# Patient Record
Sex: MALE | Race: WHITE | NOT HISPANIC OR LATINO | Employment: OTHER | URBAN - NONMETROPOLITAN AREA
[De-identification: names, ages, dates, MRNs, and addresses within clinical notes are randomized per-mention and may not be internally consistent; named-entity substitution may affect disease eponyms.]

---

## 2021-05-30 ENCOUNTER — APPOINTMENT (EMERGENCY)
Dept: ULTRASOUND IMAGING | Facility: HOSPITAL | Age: 61
End: 2021-05-30
Payer: COMMERCIAL

## 2021-05-30 ENCOUNTER — ANESTHESIA (OUTPATIENT)
Dept: PERIOP | Facility: HOSPITAL | Age: 61
End: 2021-05-30
Payer: COMMERCIAL

## 2021-05-30 ENCOUNTER — APPOINTMENT (EMERGENCY)
Dept: CT IMAGING | Facility: HOSPITAL | Age: 61
End: 2021-05-30
Payer: COMMERCIAL

## 2021-05-30 ENCOUNTER — ANESTHESIA EVENT (OUTPATIENT)
Dept: PERIOP | Facility: HOSPITAL | Age: 61
End: 2021-05-30
Payer: COMMERCIAL

## 2021-05-30 ENCOUNTER — HOSPITAL ENCOUNTER (OUTPATIENT)
Facility: HOSPITAL | Age: 61
Setting detail: OUTPATIENT SURGERY
Discharge: HOME | End: 2021-05-31
Attending: STUDENT IN AN ORGANIZED HEALTH CARE EDUCATION/TRAINING PROGRAM | Admitting: STUDENT IN AN ORGANIZED HEALTH CARE EDUCATION/TRAINING PROGRAM
Payer: COMMERCIAL

## 2021-05-30 DIAGNOSIS — K82.8 GALLBLADDER SLUDGE: Primary | ICD-10-CM

## 2021-05-30 DIAGNOSIS — R10.11 RUQ ABDOMINAL PAIN: ICD-10-CM

## 2021-05-30 DIAGNOSIS — K81.0 ACUTE CHOLECYSTITIS: ICD-10-CM

## 2021-05-30 PROBLEM — F41.9 ANXIETY: Status: ACTIVE | Noted: 2021-05-30

## 2021-05-30 PROBLEM — K21.9 GASTROESOPHAGEAL REFLUX DISEASE: Status: ACTIVE | Noted: 2021-05-30

## 2021-05-30 PROBLEM — E78.5 HYPERLIPIDEMIA: Status: ACTIVE | Noted: 2021-05-30

## 2021-05-30 PROBLEM — I10 HTN (HYPERTENSION): Status: ACTIVE | Noted: 2021-05-30

## 2021-05-30 PROBLEM — E11.9 DIABETES MELLITUS, TYPE 2 (HCC): Status: ACTIVE | Noted: 2021-05-30

## 2021-05-30 LAB
ALBUMIN SERPL BCP-MCNC: 4.1 G/DL (ref 3.5–5)
ALP SERPL-CCNC: 117 U/L (ref 46–116)
ALT SERPL W P-5'-P-CCNC: 43 U/L (ref 12–78)
ANION GAP SERPL CALCULATED.3IONS-SCNC: 6 MMOL/L (ref 4–13)
AST SERPL W P-5'-P-CCNC: 20 U/L (ref 5–45)
BASOPHILS # BLD AUTO: 0.08 THOUSANDS/ΜL (ref 0–0.1)
BASOPHILS NFR BLD AUTO: 1 % (ref 0–1)
BILIRUB DIRECT SERPL-MCNC: 0.1 MG/DL (ref 0–0.2)
BILIRUB SERPL-MCNC: 0.34 MG/DL (ref 0.2–1)
BUN SERPL-MCNC: 17 MG/DL (ref 5–25)
CALCIUM SERPL-MCNC: 9 MG/DL (ref 8.3–10.1)
CHLORIDE SERPL-SCNC: 104 MMOL/L (ref 100–108)
CO2 SERPL-SCNC: 30 MMOL/L (ref 21–32)
CREAT SERPL-MCNC: 1.2 MG/DL (ref 0.6–1.3)
EOSINOPHIL # BLD AUTO: 0.41 THOUSAND/ΜL (ref 0–0.61)
EOSINOPHIL NFR BLD AUTO: 5 % (ref 0–6)
ERYTHROCYTE [DISTWIDTH] IN BLOOD BY AUTOMATED COUNT: 12.3 % (ref 11.6–15.1)
GFR SERPL CREATININE-BSD FRML MDRD: 66 ML/MIN/1.73SQ M
GLUCOSE SERPL-MCNC: 186 MG/DL (ref 65–140)
GLUCOSE SERPL-MCNC: 192 MG/DL (ref 65–140)
HCT VFR BLD AUTO: 44.3 % (ref 36.5–49.3)
HGB BLD-MCNC: 14.7 G/DL (ref 12–17)
IMM GRANULOCYTES # BLD AUTO: 0.02 THOUSAND/UL (ref 0–0.2)
IMM GRANULOCYTES NFR BLD AUTO: 0 % (ref 0–2)
LIPASE SERPL-CCNC: 315 U/L (ref 73–393)
LYMPHOCYTES # BLD AUTO: 3.86 THOUSANDS/ΜL (ref 0.6–4.47)
LYMPHOCYTES NFR BLD AUTO: 43 % (ref 14–44)
MCH RBC QN AUTO: 31.6 PG (ref 26.8–34.3)
MCHC RBC AUTO-ENTMCNC: 33.2 G/DL (ref 31.4–37.4)
MCV RBC AUTO: 95 FL (ref 82–98)
MONOCYTES # BLD AUTO: 0.78 THOUSAND/ΜL (ref 0.17–1.22)
MONOCYTES NFR BLD AUTO: 9 % (ref 4–12)
NEUTROPHILS # BLD AUTO: 3.8 THOUSANDS/ΜL (ref 1.85–7.62)
NEUTS SEG NFR BLD AUTO: 42 % (ref 43–75)
NRBC BLD AUTO-RTO: 0 /100 WBCS
PLATELET # BLD AUTO: 245 THOUSANDS/UL (ref 149–390)
PMV BLD AUTO: 9.4 FL (ref 8.9–12.7)
POTASSIUM SERPL-SCNC: 4.4 MMOL/L (ref 3.5–5.3)
PROT SERPL-MCNC: 6.9 G/DL (ref 6.4–8.2)
RBC # BLD AUTO: 4.65 MILLION/UL (ref 3.88–5.62)
SODIUM SERPL-SCNC: 140 MMOL/L (ref 136–145)
WBC # BLD AUTO: 8.95 THOUSAND/UL (ref 4.31–10.16)

## 2021-05-30 PROCEDURE — 96361 HYDRATE IV INFUSION ADD-ON: CPT

## 2021-05-30 PROCEDURE — 36415 COLL VENOUS BLD VENIPUNCTURE: CPT | Performed by: STUDENT IN AN ORGANIZED HEALTH CARE EDUCATION/TRAINING PROGRAM

## 2021-05-30 PROCEDURE — 96376 TX/PRO/DX INJ SAME DRUG ADON: CPT

## 2021-05-30 PROCEDURE — 80048 BASIC METABOLIC PNL TOTAL CA: CPT | Performed by: STUDENT IN AN ORGANIZED HEALTH CARE EDUCATION/TRAINING PROGRAM

## 2021-05-30 PROCEDURE — 83690 ASSAY OF LIPASE: CPT | Performed by: STUDENT IN AN ORGANIZED HEALTH CARE EDUCATION/TRAINING PROGRAM

## 2021-05-30 PROCEDURE — 96374 THER/PROPH/DIAG INJ IV PUSH: CPT

## 2021-05-30 PROCEDURE — G1004 CDSM NDSC: HCPCS

## 2021-05-30 PROCEDURE — 88304 TISSUE EXAM BY PATHOLOGIST: CPT | Performed by: PATHOLOGY

## 2021-05-30 PROCEDURE — 99285 EMERGENCY DEPT VISIT HI MDM: CPT

## 2021-05-30 PROCEDURE — 76705 ECHO EXAM OF ABDOMEN: CPT

## 2021-05-30 PROCEDURE — 85025 COMPLETE CBC W/AUTO DIFF WBC: CPT | Performed by: STUDENT IN AN ORGANIZED HEALTH CARE EDUCATION/TRAINING PROGRAM

## 2021-05-30 PROCEDURE — 99284 EMERGENCY DEPT VISIT MOD MDM: CPT | Performed by: STUDENT IN AN ORGANIZED HEALTH CARE EDUCATION/TRAINING PROGRAM

## 2021-05-30 PROCEDURE — 74176 CT ABD & PELVIS W/O CONTRAST: CPT

## 2021-05-30 PROCEDURE — 80076 HEPATIC FUNCTION PANEL: CPT | Performed by: STUDENT IN AN ORGANIZED HEALTH CARE EDUCATION/TRAINING PROGRAM

## 2021-05-30 PROCEDURE — 82948 REAGENT STRIP/BLOOD GLUCOSE: CPT

## 2021-05-30 RX ORDER — KETOROLAC TROMETHAMINE 30 MG/ML
15 INJECTION, SOLUTION INTRAMUSCULAR; INTRAVENOUS ONCE
Status: COMPLETED | OUTPATIENT
Start: 2021-05-30 | End: 2021-05-30

## 2021-05-30 RX ORDER — NEOSTIGMINE METHYLSULFATE 1 MG/ML
INJECTION INTRAVENOUS AS NEEDED
Status: DISCONTINUED | OUTPATIENT
Start: 2021-05-30 | End: 2021-05-30

## 2021-05-30 RX ORDER — HYDROMORPHONE HCL/PF 1 MG/ML
SYRINGE (ML) INJECTION AS NEEDED
Status: DISCONTINUED | OUTPATIENT
Start: 2021-05-30 | End: 2021-05-30

## 2021-05-30 RX ORDER — DEXAMETHASONE SODIUM PHOSPHATE 4 MG/ML
INJECTION, SOLUTION INTRA-ARTICULAR; INTRALESIONAL; INTRAMUSCULAR; INTRAVENOUS; SOFT TISSUE AS NEEDED
Status: DISCONTINUED | OUTPATIENT
Start: 2021-05-30 | End: 2021-05-30

## 2021-05-30 RX ORDER — PANTOPRAZOLE SODIUM 40 MG/1
40 TABLET, DELAYED RELEASE ORAL
Status: DISCONTINUED | OUTPATIENT
Start: 2021-05-31 | End: 2021-05-31 | Stop reason: HOSPADM

## 2021-05-30 RX ORDER — FLUVOXAMINE MALEATE 100 MG
100 TABLET ORAL
COMMUNITY

## 2021-05-30 RX ORDER — MIDAZOLAM HYDROCHLORIDE 2 MG/2ML
INJECTION, SOLUTION INTRAMUSCULAR; INTRAVENOUS AS NEEDED
Status: DISCONTINUED | OUTPATIENT
Start: 2021-05-30 | End: 2021-05-30

## 2021-05-30 RX ORDER — DULAGLUTIDE 0.75 MG/.5ML
INJECTION, SOLUTION SUBCUTANEOUS
COMMUNITY

## 2021-05-30 RX ORDER — ONDANSETRON 2 MG/ML
INJECTION INTRAMUSCULAR; INTRAVENOUS AS NEEDED
Status: DISCONTINUED | OUTPATIENT
Start: 2021-05-30 | End: 2021-05-30

## 2021-05-30 RX ORDER — ROCURONIUM BROMIDE 10 MG/ML
INJECTION, SOLUTION INTRAVENOUS AS NEEDED
Status: DISCONTINUED | OUTPATIENT
Start: 2021-05-30 | End: 2021-05-30

## 2021-05-30 RX ORDER — ALPRAZOLAM 1 MG/1
TABLET ORAL
COMMUNITY

## 2021-05-30 RX ORDER — GLYCOPYRROLATE 0.2 MG/ML
INJECTION INTRAMUSCULAR; INTRAVENOUS AS NEEDED
Status: DISCONTINUED | OUTPATIENT
Start: 2021-05-30 | End: 2021-05-30

## 2021-05-30 RX ORDER — LOSARTAN POTASSIUM 25 MG/1
25 TABLET ORAL DAILY
Status: DISCONTINUED | OUTPATIENT
Start: 2021-05-30 | End: 2021-05-31 | Stop reason: HOSPADM

## 2021-05-30 RX ORDER — CALCIUM CARBONATE 300MG(750)
TABLET,CHEWABLE ORAL
COMMUNITY

## 2021-05-30 RX ORDER — ALBUTEROL SULFATE 2.5 MG/3ML
2.5 SOLUTION RESPIRATORY (INHALATION) ONCE AS NEEDED
Status: DISCONTINUED | OUTPATIENT
Start: 2021-05-30 | End: 2021-05-30 | Stop reason: HOSPADM

## 2021-05-30 RX ORDER — PANTOPRAZOLE SODIUM 40 MG/1
TABLET, DELAYED RELEASE ORAL
COMMUNITY

## 2021-05-30 RX ORDER — LIDOCAINE HYDROCHLORIDE AND EPINEPHRINE 10; 10 MG/ML; UG/ML
INJECTION, SOLUTION INFILTRATION; PERINEURAL AS NEEDED
Status: DISCONTINUED | OUTPATIENT
Start: 2021-05-30 | End: 2021-05-30 | Stop reason: HOSPADM

## 2021-05-30 RX ORDER — LOSARTAN POTASSIUM 25 MG/1
25 TABLET ORAL DAILY
COMMUNITY

## 2021-05-30 RX ORDER — FENTANYL CITRATE/PF 50 MCG/ML
25 SYRINGE (ML) INJECTION
Status: DISCONTINUED | OUTPATIENT
Start: 2021-05-30 | End: 2021-05-30 | Stop reason: HOSPADM

## 2021-05-30 RX ORDER — FEXOFENADINE HCL 180 MG/1
TABLET ORAL
COMMUNITY

## 2021-05-30 RX ORDER — CEFAZOLIN SODIUM 2 G/50ML
2000 SOLUTION INTRAVENOUS
Status: COMPLETED | OUTPATIENT
Start: 2021-05-30 | End: 2021-05-30

## 2021-05-30 RX ORDER — IBUPROFEN 600 MG/1
600 TABLET ORAL EVERY 6 HOURS PRN
Status: DISCONTINUED | OUTPATIENT
Start: 2021-05-30 | End: 2021-05-31 | Stop reason: HOSPADM

## 2021-05-30 RX ORDER — ALPRAZOLAM 0.5 MG/1
1 TABLET ORAL
Status: DISCONTINUED | OUTPATIENT
Start: 2021-05-30 | End: 2021-05-31 | Stop reason: HOSPADM

## 2021-05-30 RX ORDER — SODIUM CHLORIDE, SODIUM LACTATE, POTASSIUM CHLORIDE, CALCIUM CHLORIDE 600; 310; 30; 20 MG/100ML; MG/100ML; MG/100ML; MG/100ML
125 INJECTION, SOLUTION INTRAVENOUS CONTINUOUS
Status: DISCONTINUED | OUTPATIENT
Start: 2021-05-30 | End: 2021-05-31

## 2021-05-30 RX ORDER — SODIUM CHLORIDE, SODIUM LACTATE, POTASSIUM CHLORIDE, CALCIUM CHLORIDE 600; 310; 30; 20 MG/100ML; MG/100ML; MG/100ML; MG/100ML
INJECTION, SOLUTION INTRAVENOUS CONTINUOUS PRN
Status: DISCONTINUED | OUTPATIENT
Start: 2021-05-30 | End: 2021-05-30

## 2021-05-30 RX ORDER — PROPOFOL 10 MG/ML
INJECTION, EMULSION INTRAVENOUS AS NEEDED
Status: DISCONTINUED | OUTPATIENT
Start: 2021-05-30 | End: 2021-05-30

## 2021-05-30 RX ORDER — ACETAMINOPHEN 325 MG/1
650 TABLET ORAL EVERY 6 HOURS PRN
Status: DISCONTINUED | OUTPATIENT
Start: 2021-05-30 | End: 2021-05-31 | Stop reason: HOSPADM

## 2021-05-30 RX ORDER — MAGNESIUM HYDROXIDE 1200 MG/15ML
LIQUID ORAL AS NEEDED
Status: DISCONTINUED | OUTPATIENT
Start: 2021-05-30 | End: 2021-05-30 | Stop reason: HOSPADM

## 2021-05-30 RX ORDER — MELATONIN
1000 DAILY
COMMUNITY

## 2021-05-30 RX ORDER — FENTANYL CITRATE 50 UG/ML
INJECTION, SOLUTION INTRAMUSCULAR; INTRAVENOUS AS NEEDED
Status: DISCONTINUED | OUTPATIENT
Start: 2021-05-30 | End: 2021-05-30

## 2021-05-30 RX ORDER — KETOROLAC TROMETHAMINE 30 MG/ML
INJECTION, SOLUTION INTRAMUSCULAR; INTRAVENOUS AS NEEDED
Status: DISCONTINUED | OUTPATIENT
Start: 2021-05-30 | End: 2021-05-30

## 2021-05-30 RX ORDER — ONDANSETRON 2 MG/ML
4 INJECTION INTRAMUSCULAR; INTRAVENOUS ONCE AS NEEDED
Status: DISCONTINUED | OUTPATIENT
Start: 2021-05-30 | End: 2021-05-30 | Stop reason: HOSPADM

## 2021-05-30 RX ORDER — ATORVASTATIN CALCIUM 20 MG/1
20 TABLET, FILM COATED ORAL DAILY
COMMUNITY

## 2021-05-30 RX ADMIN — MORPHINE SULFATE 2 MG: 2 INJECTION, SOLUTION INTRAMUSCULAR; INTRAVENOUS at 16:29

## 2021-05-30 RX ADMIN — PROPOFOL 50 MG: 10 INJECTION, EMULSION INTRAVENOUS at 13:19

## 2021-05-30 RX ADMIN — ACETAMINOPHEN 650 MG: 325 TABLET ORAL at 19:10

## 2021-05-30 RX ADMIN — CEFAZOLIN SODIUM 2000 MG: 2 SOLUTION INTRAVENOUS at 12:25

## 2021-05-30 RX ADMIN — SODIUM CHLORIDE, SODIUM LACTATE, POTASSIUM CHLORIDE, AND CALCIUM CHLORIDE 125 ML/HR: .6; .31; .03; .02 INJECTION, SOLUTION INTRAVENOUS at 17:13

## 2021-05-30 RX ADMIN — HYDROMORPHONE HYDROCHLORIDE 0.5 MG: 1 INJECTION, SOLUTION INTRAMUSCULAR; INTRAVENOUS; SUBCUTANEOUS at 13:19

## 2021-05-30 RX ADMIN — ONDANSETRON 4 MG: 2 INJECTION INTRAMUSCULAR; INTRAVENOUS at 13:20

## 2021-05-30 RX ADMIN — DEXAMETHASONE SODIUM PHOSPHATE 4 MG: 4 INJECTION, SOLUTION INTRAMUSCULAR; INTRAVENOUS at 12:45

## 2021-05-30 RX ADMIN — KETOROLAC TROMETHAMINE 15 MG: 30 INJECTION, SOLUTION INTRAMUSCULAR at 08:06

## 2021-05-30 RX ADMIN — PROPOFOL 200 MG: 10 INJECTION, EMULSION INTRAVENOUS at 12:31

## 2021-05-30 RX ADMIN — KETOROLAC TROMETHAMINE 15 MG: 30 INJECTION, SOLUTION INTRAMUSCULAR at 13:20

## 2021-05-30 RX ADMIN — KETOROLAC TROMETHAMINE 15 MG: 30 INJECTION, SOLUTION INTRAMUSCULAR at 05:54

## 2021-05-30 RX ADMIN — FENTANYL CITRATE 100 MCG: 50 INJECTION, SOLUTION INTRAMUSCULAR; INTRAVENOUS at 12:26

## 2021-05-30 RX ADMIN — SODIUM CHLORIDE, SODIUM LACTATE, POTASSIUM CHLORIDE, AND CALCIUM CHLORIDE: .6; .31; .03; .02 INJECTION, SOLUTION INTRAVENOUS at 12:25

## 2021-05-30 RX ADMIN — METFORMIN HYDROCHLORIDE 1000 MG: 500 TABLET ORAL at 16:53

## 2021-05-30 RX ADMIN — NEOSTIGMINE METHYLSULFATE 3 MG: 1 INJECTION, SOLUTION INTRAVENOUS at 13:34

## 2021-05-30 RX ADMIN — SODIUM CHLORIDE 1000 ML: 0.9 INJECTION, SOLUTION INTRAVENOUS at 05:54

## 2021-05-30 RX ADMIN — SODIUM CHLORIDE, SODIUM LACTATE, POTASSIUM CHLORIDE, AND CALCIUM CHLORIDE: .6; .31; .03; .02 INJECTION, SOLUTION INTRAVENOUS at 13:12

## 2021-05-30 RX ADMIN — ROCURONIUM BROMIDE 50 MG: 10 INJECTION, SOLUTION INTRAVENOUS at 12:32

## 2021-05-30 RX ADMIN — LOSARTAN POTASSIUM 25 MG: 25 TABLET, FILM COATED ORAL at 20:07

## 2021-05-30 RX ADMIN — GLYCOPYRROLATE 0.4 MG: 0.2 INJECTION, SOLUTION INTRAMUSCULAR; INTRAVENOUS at 13:34

## 2021-05-30 RX ADMIN — HYDROMORPHONE HYDROCHLORIDE 0.5 MG: 1 INJECTION, SOLUTION INTRAMUSCULAR; INTRAVENOUS; SUBCUTANEOUS at 12:57

## 2021-05-30 RX ADMIN — MIDAZOLAM HYDROCHLORIDE 2 MG: 1 INJECTION, SOLUTION INTRAMUSCULAR; INTRAVENOUS at 12:22

## 2021-05-30 RX ADMIN — IBUPROFEN 600 MG: 600 TABLET ORAL at 22:40

## 2021-05-30 NOTE — ANESTHESIA POSTPROCEDURE EVALUATION
Post-Op Assessment Note    CV Status:  Stable    Pain management: adequate     Mental Status:  Alert and awake   Hydration Status:  Euvolemic   PONV Controlled:  Controlled   Airway Patency:  Patent      Post Op Vitals Reviewed: Yes      Staff: CRNA         No complications documented      /76 (05/30/21 1350)    Temp 97 6 °F (36 4 °C) (05/30/21 1350)    Pulse 74 (05/30/21 1350)   Resp 16 (05/30/21 1350)    SpO2 97 % (05/30/21 1350)

## 2021-05-30 NOTE — H&P
H&P Exam - General Surgery   Drew Turner 63 y o  male MRN: [id number]  Unit/Bed#: ED 12 Encounter: [id number]    Assessment/Plan      Assessment:  74-year-old male who presents with right upper quadrant abdominal pain  CT scan is equivocal for cholelithiasis  An ultrasound was completed which does show signs consistent with acute calculous cholecystitis     Plan: Will proceed with laparoscopic possible open cholecystectomy  Procedure was discussed in detail with the patient who agrees to proceed  All questions were answered  He will be admitted for observation postoperatively  On-call antibiotics to the operating room      History of Present Illness            HPI:  Drew Turner is a 63 y o  male who presents with right upper quadrant abdominal pain  The patient states he was awoken this morning by severe cramping abdominal pain in the right upper quadrant  He has never had a pain like this before  He has had kidney stones however he felt this pain was different  He denies any nausea or vomiting  In the past, fatty foods have caused diarrhea         Review of Systems   Constitutional: Negative  HENT: Negative  Respiratory: Negative  Cardiovascular: Negative  Gastrointestinal: Positive for abdominal pain  Negative for nausea and vomiting  Genitourinary: Negative  Musculoskeletal: Negative  Skin: Negative  Neurological: Negative           Historical Information         Medical History   History reviewed  No pertinent past medical history  Surgical History   History reviewed  No pertinent surgical history       Social History         Social History           Substance and Sexual Activity   Alcohol Use Yes    Frequency: 2-3 times a week      Social History          Substance and Sexual Activity   Drug Use Not Currently            E-Cigarette/Vaping    E-Cigarette Use Never User              E-Cigarette/Vaping Substances    Nicotine No      THC No      CBD No      Flavoring No      Other No      Unknown No        Social History           Tobacco Use   Smoking Status Former Smoker    Types: Cigarettes   Smokeless Tobacco Never Used      Family History: non-contributory     Meds/Allergies   all current active meds have been reviewed       Allergies   Allergen Reactions    Iodinated Diagnostic Agents Tachycardia         Objective   First Vitals:   Blood Pressure: 138/84 (05/30/21 0541)  Pulse: 72 (05/30/21 0541)  Temperature: 98 3 °F (36 8 °C) (05/30/21 0539)  Temp Source: Temporal (05/30/21 0539)  Respirations: 15 (05/30/21 0541)  Height: 5' 9" (175 3 cm) (05/30/21 0541)  Weight - Scale: 80 8 kg (178 lb 2 1 oz) (05/30/21 0541)  SpO2: 100 % (05/30/21 0541)     Current Vitals:   Blood Pressure: 138/87 (05/30/21 0730)  Pulse: 73 (05/30/21 0730)  Temperature: 98 3 °F (36 8 °C) (05/30/21 0539)  Temp Source: Temporal (05/30/21 0539)  Respirations: 16 (05/30/21 0630)  Height: 5' 9" (175 3 cm) (05/30/21 0541)  Weight - Scale: 80 8 kg (178 lb 2 1 oz) (05/30/21 0541)  SpO2: 98 % (05/30/21 0730)        Intake/Output Summary (Last 24 hours) at 5/30/2021 0746  Last data filed at 5/30/2021 2595      Gross per 24 hour   Intake 1000 ml   Output --   Net 1000 ml             Invasive Devices            Peripheral Intravenous Line                     Peripheral IV 05/30/21 Right Antecubital less than 1 day                     Physical Exam  Constitutional:       Appearance: Normal appearance  HENT:      Head: Normocephalic and atraumatic  Mouth/Throat:      Mouth: Mucous membranes are moist    Cardiovascular:      Rate and Rhythm: Normal rate and regular rhythm  Pulmonary:      Effort: Pulmonary effort is normal       Breath sounds: Normal breath sounds  Abdominal:      General: There is no distension  Palpations: Abdomen is soft  There is no mass  Tenderness: There is abdominal tenderness (RUQ moderate)  There is no guarding  Skin:     General: Skin is warm and dry  Coloration: Skin is not jaundiced  Neurological:      General: No focal deficit present  Mental Status: He is alert           Lab Results:   CBC:         Lab Results   Component Value Date     WBC 8 95 05/30/2021     HGB 14 7 05/30/2021     HCT 44 3 05/30/2021     MCV 95 05/30/2021      05/30/2021     MCH 31 6 05/30/2021     MCHC 33 2 05/30/2021     RDW 12 3 05/30/2021     MPV 9 4 05/30/2021     NRBC 0 05/30/2021   , CMP:         Lab Results   Component Value Date     SODIUM 140 05/30/2021     K 4 4 05/30/2021      05/30/2021     CO2 30 05/30/2021     BUN 17 05/30/2021     CREATININE 1 20 05/30/2021     CALCIUM 9 0 05/30/2021     AST 20 05/30/2021     ALT 43 05/30/2021     ALKPHOS 117 (H) 05/30/2021     EGFR 66 05/30/2021   , Lipase:         Lab Results   Component Value Date     LIPASE 315 05/30/2021      Imaging: CT abd/pelvis:   IMPRESSION:  1   Distended gallbladder with either layering small calculi versus hyperdense sludge   No CT evidence for acute cholecystitis at this time     If there is continued concern for acute cholecystitis, consider follow-up nuclear medicine HIDA scan to   evaluate for cystic duct patency   If deferred, follow-up right upper quadrant ultrasound should be obtained   Follow-up surgical consultation recommended      2   Moderate constipation      3   Hiatal hernia with reflux into the distal esophagus   Recommend follow-up GI consultation and/or upper endoscopy      4   1 mm nonobstructing calyceal calculus posterior mid pole right kidney      Abdominal Ultrasound:  RIGHT UPPER QUADRANT ULTRASOUND     INDICATION:     Epigastric pain      COMPARISON:  None     TECHNIQUE:   Real-time ultrasound of the right upper quadrant was performed with a curvilinear transducer with both volumetric sweeps and still imaging techniques      FINDINGS:     PANCREAS:  Obscured by overlying bowel gas      AORTA AND IVC:  Visualized portions are normal for patient age      LIVER:  Size: Within normal range  The liver measures 16 cm in the midclavicular line  Contour:  Surface contour is smooth  Parenchyma: There is mild diffuse increased echogenicity with smooth echotexture, without significant beam attenuation or loss of periportal echogenicity  Most consistent with mild hepatic steatosis  No evidence of suspicious mass  Ill-defined 1 9 x 1 4 x 1 7 cm hypoechoic focus adjacent to the gallbladder fossa may represent an area of focal fatty sparing  Limited imaging of the main portal vein shows it to be patent and hepatopetal      BILIARY:  Sludge is seen in the gallbladder  Trace pericholecystic fluid  Positive sonographic Daley's sign elicited  No intrahepatic biliary dilatation  CBD measures 6 mm  No choledocholithiasis      KIDNEY:   Right kidney measures 11 0 cm     Within normal limits      ASCITES:   None      IMPRESSION:     Findings suggestive of acute cholecystitis      Hepatic steatosis with small hypoechoic focus adjacent to the gallbladder fossa, probably an area of focal fatty sparing      Pancreas obscured by overlying bowel gas         The study was marked in EPIC for immediate notification      EKG, Pathology, and Other Studies: I have personally reviewed pertinent reports

## 2021-05-30 NOTE — CONSULTS
Consultation - General Surgery   Lynsey Reid 63 y o  male MRN: [id number]  Unit/Bed#: ED 12 Encounter: [id number]    Assessment/Plan     Assessment:  68-year-old male who presents with right upper quadrant abdominal pain  CT scan is equivocal for cholelithiasis  Laboratory results show only mildly elevated alk-phos which is not specific for gallbladder pathology    Plan:  Recommend evaluation with ultrasound of the right upper quadrant  If no signs concerning for acute cholecystitis, the patient may be discharged home if his symptoms further improve  The patient was also planning on seeing me on an elective basis for evaluation of a right inguinal hernia  He may follow-up with me for both right upper quadrant abdominal pain and the inguinal hernia  History of Present Illness     HPI:  Lynsey Reid is a 63 y o  male who presents with right upper quadrant abdominal pain  The patient states he was awoken this morning by severe cramping abdominal pain in the right upper quadrant  He has never had a pain like this before  He has had kidney stones however he felt this pain was different  He denies any nausea or vomiting  In the past, fatty foods have caused diarrhea  Review of Systems   Constitutional: Negative  HENT: Negative  Respiratory: Negative  Cardiovascular: Negative  Gastrointestinal: Positive for abdominal pain  Negative for nausea and vomiting  Genitourinary: Negative  Musculoskeletal: Negative  Skin: Negative  Neurological: Negative  Historical Information   History reviewed  No pertinent past medical history  History reviewed  No pertinent surgical history    Social History   Social History     Substance and Sexual Activity   Alcohol Use Yes    Frequency: 2-3 times a week     Social History     Substance and Sexual Activity   Drug Use Not Currently     E-Cigarette/Vaping    E-Cigarette Use Never User      E-Cigarette/Vaping Substances    Nicotine No     THC No     CBD No     Flavoring No     Other No     Unknown No      Social History     Tobacco Use   Smoking Status Former Smoker    Types: Cigarettes   Smokeless Tobacco Never Used     Family History: non-contributory    Meds/Allergies   all current active meds have been reviewed  Allergies   Allergen Reactions    Iodinated Diagnostic Agents Tachycardia       Objective   First Vitals:   Blood Pressure: 138/84 (05/30/21 0541)  Pulse: 72 (05/30/21 0541)  Temperature: 98 3 °F (36 8 °C) (05/30/21 0539)  Temp Source: Temporal (05/30/21 0539)  Respirations: 15 (05/30/21 0541)  Height: 5' 9" (175 3 cm) (05/30/21 0541)  Weight - Scale: 80 8 kg (178 lb 2 1 oz) (05/30/21 0541)  SpO2: 100 % (05/30/21 0541)    Current Vitals:   Blood Pressure: 138/87 (05/30/21 0730)  Pulse: 73 (05/30/21 0730)  Temperature: 98 3 °F (36 8 °C) (05/30/21 0539)  Temp Source: Temporal (05/30/21 0539)  Respirations: 16 (05/30/21 0630)  Height: 5' 9" (175 3 cm) (05/30/21 0541)  Weight - Scale: 80 8 kg (178 lb 2 1 oz) (05/30/21 0541)  SpO2: 98 % (05/30/21 0730)      Intake/Output Summary (Last 24 hours) at 5/30/2021 0746  Last data filed at 5/30/2021 0706  Gross per 24 hour   Intake 1000 ml   Output --   Net 1000 ml       Invasive Devices     Peripheral Intravenous Line            Peripheral IV 05/30/21 Right Antecubital less than 1 day                Physical Exam  Constitutional:       Appearance: Normal appearance  HENT:      Head: Normocephalic and atraumatic  Mouth/Throat:      Mouth: Mucous membranes are moist    Cardiovascular:      Rate and Rhythm: Normal rate and regular rhythm  Pulmonary:      Effort: Pulmonary effort is normal       Breath sounds: Normal breath sounds  Abdominal:      General: There is no distension  Palpations: Abdomen is soft  There is no mass  Tenderness: There is abdominal tenderness (RUQ moderate)  There is no guarding  Skin:     General: Skin is warm and dry        Coloration: Skin is not jaundiced  Neurological:      General: No focal deficit present  Mental Status: He is alert  Lab Results:   CBC:   Lab Results   Component Value Date    WBC 8 95 05/30/2021    HGB 14 7 05/30/2021    HCT 44 3 05/30/2021    MCV 95 05/30/2021     05/30/2021    MCH 31 6 05/30/2021    MCHC 33 2 05/30/2021    RDW 12 3 05/30/2021    MPV 9 4 05/30/2021    NRBC 0 05/30/2021   , CMP:   Lab Results   Component Value Date    SODIUM 140 05/30/2021    K 4 4 05/30/2021     05/30/2021    CO2 30 05/30/2021    BUN 17 05/30/2021    CREATININE 1 20 05/30/2021    CALCIUM 9 0 05/30/2021    AST 20 05/30/2021    ALT 43 05/30/2021    ALKPHOS 117 (H) 05/30/2021    EGFR 66 05/30/2021   , Lipase:   Lab Results   Component Value Date    LIPASE 315 05/30/2021     Imaging: CT abd/pelvis:   IMPRESSION:  1  Distended gallbladder with either layering small calculi versus hyperdense sludge  No CT evidence for acute cholecystitis at this time  If there is continued concern for acute cholecystitis, consider follow-up nuclear medicine HIDA scan to   evaluate for cystic duct patency  If deferred, follow-up right upper quadrant ultrasound should be obtained  Follow-up surgical consultation recommended      2  Moderate constipation      3   Hiatal hernia with reflux into the distal esophagus  Recommend follow-up GI consultation and/or upper endoscopy      4   1 mm nonobstructing calyceal calculus posterior mid pole right kidney         The study was marked in EPIC for immediate notification  EKG, Pathology, and Other Studies: I have personally reviewed pertinent reports

## 2021-05-30 NOTE — ANESTHESIA PREPROCEDURE EVALUATION
Procedure:  CHOLECYSTECTOMY LAPAROSCOPIC (N/A Abdomen)    Relevant Problems   CARDIO  Patient was having epigastric pain, being worked up, states EKG was negative and is supposed to have stress test next week; currently denies chets pain or palpatons   (+) HTN (hypertension)   (+) Hyperlipidemia   (-) Angina of effort (HCC)   (-) Chest pain   (-) Dysrhythmias   (-) Murmur      ENDO   (+) Diabetes mellitus, type 2 (HCC)      GI/HEPATIC   (+) Gastroesophageal reflux disease      /RENAL (within normal limits)      HEMATOLOGY (within normal limits)      MUSCULOSKELETAL (within normal limits)      NEURO/PSYCH  obsessive compulsive disorder   (+) Anxiety      PULMONARY (within normal limits)   (-) Asthma   (-) Shortness of breath   (-) Smoking        Physical Exam    Airway      TM Distance: <3 FB  Neck ROM: full     Dental   No notable dental hx     Cardiovascular  Rhythm: regular, Rate: normal,     Pulmonary  Breath sounds clear to auscultation,     Other Findings        Anesthesia Plan  ASA Score- 2     Anesthesia Type- general with ASA Monitors  Additional Monitors:   Airway Plan: ETT  Plan Factors-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

## 2021-05-30 NOTE — DISCHARGE INSTRUCTIONS
Laparoscopic Cholecystectomy   WHAT YOU NEED TO KNOW:   Laparoscopic cholecystectomy is surgery to remove your gallbladder  DISCHARGE INSTRUCTIONS:   Call Dr Syeda Mullen office at [phone] with any questions or concerns    Medicines: You may need any of the following:  · Prescription pain medicine - Take as directed    · You may also take tylenol as needed    · Take your medicine as directed  Contact your healthcare provider if you think your medicine is not helping or if you have side effects  Tell her if you are allergic to any medicine  Keep a list of the medicines, vitamins, and herbs you take  Include the amounts, and when and why you take them  Bring the list or the pill bottles to follow-up visits  Carry your medicine list with you in case of an emergency  Follow up with your healthcare provider 2 weeks after surgery, or as directed:  Write down your questions so you remember to ask them during your visits  Wound care: You may shower and pat the incisions dry  Do not soak underwater for 2 weeks   What to eat after surgery: You may eat whatever you feel up to following surgery  You may notice diarrhea with fatty foods  Drink plenty of liquids  When to return to work and other activities:  No lifting, pushing, or pulling greater then 10lb for 2 weeks  Walk as much as possible  YOU may drive when you are comfortable enough to turn and press the brake without pain medication    Contact your healthcare provider if:   · You have a fever over 101°F (38°C) or chills  · You have pain or nausea that is not relieved by medicine  · You have redness and swelling around your incisions, or blood or pus is leaking from your incisions  · You are constipated or have diarrhea  · Your skin or eyes are yellow, or your bowel movements are pale  · You have questions or concerns about your surgery, condition, or care  Seek care immediately or call 911 if:   · You cannot stop vomiting  · Your bowel movements are black or bloody  · You have pain in your abdomen and it is swollen or hard  · Your arm or leg feels warm, tender, and painful  It may look swollen and red  · You feel lightheaded, short of breath, and have chest pain  · You cough up blood  © 2017 [address] [employer] Information is for End User's use only and may not be sold, redistributed or otherwise used for commercial purposes  All illustrations and images included in CareNotes® are the copyrighted property of [employer] , [employer]  or [employer] Calzada  The above information is an  only  It is not intended as medical advice for individual conditions or treatments  Talk to your doctor, nurse or pharmacist before following any medical regimen to see if it is safe and effective for you

## 2021-05-30 NOTE — PLAN OF CARE
Problem: PAIN - ADULT  Goal: Verbalizes/displays adequate comfort level or baseline comfort level  Description: Interventions:  - Encourage patient to monitor pain and request assistance  - Assess pain using appropriate pain scale  - Administer analgesics based on type and severity of pain and evaluate response  - Implement non-pharmacological measures as appropriate and evaluate response  - Consider cultural and social influences on pain and pain management  - Notify physician/advanced practitioner if interventions unsuccessful or patient reports new pain  Outcome: Progressing     Problem: INFECTION - ADULT  Goal: Absence or prevention of progression during hospitalization  Description: INTERVENTIONS:  - Assess and monitor for signs and symptoms of infection  - Monitor lab/diagnostic results  - Monitor all insertion sites, i e  indwelling lines, tubes, and drains  - Monitor endotracheal if appropriate and nasal secretions for changes in amount and color  - [place] appropriate cooling/warming therapies per order  - Administer medications as ordered  - Instruct and encourage patient and family to use good hand hygiene technique  - Identify and instruct in appropriate isolation precautions for identified infection/condition  Outcome: Progressing     Problem: SAFETY ADULT  Goal: Patient will remain free of falls  Description: INTERVENTIONS:  - Assess patient frequently for physical needs  -  Identify cognitive and physical deficits and behaviors that affect risk of falls    -  [place] fall precautions as indicated by assessment   - Educate patient/family on patient safety including physical limitations  - Instruct patient to call for assistance with activity based on assessment  - Modify environment to reduce risk of injury  - Consider OT/PT consult to assist with strengthening/mobility  Outcome: Progressing  Goal: Maintain or return to baseline ADL function  Description: INTERVENTIONS:  -  Assess patient's ability to carry out ADLs; assess patient's baseline for ADL function and identify physical deficits which impact ability to perform ADLs (bathing, care of mouth/teeth, toileting, grooming, dressing, etc )  - Assess/evaluate cause of self-care deficits   - Assess range of motion  - Assess patient's mobility; develop plan if impaired  - Assess patient's need for assistive devices and provide as appropriate  - Encourage maximum independence but intervene and supervise when necessary  - Involve family in performance of ADLs  - Assess for home care needs following discharge   - Consider OT consult to assist with ADL evaluation and planning for discharge  - Provide patient education as appropriate  Outcome: Progressing  Goal: Maintain or return mobility status to optimal level  Description: INTERVENTIONS:  - Assess patient's baseline mobility status (ambulation, transfers, stairs, etc )    - Identify cognitive and physical deficits and behaviors that affect mobility  - Identify mobility aids required to assist with transfers and/or ambulation (gait belt, sit-to-stand, lift, walker, cane, etc )  - [place] fall precautions as indicated by assessment  - Record patient progress and toleration of activity level on Mobility SBAR; progress patient to next Phase/Stage  - Instruct patient to call for assistance with activity based on assessment  - Consider rehabilitation consult to assist with strengthening/weightbearing, etc   Outcome: Progressing     Problem: DISCHARGE PLANNING  Goal: Discharge to home or other facility with appropriate resources  Description: INTERVENTIONS:  - Identify barriers to discharge w/patient and caregiver  - Arrange for needed discharge resources and transportation as appropriate  - Identify discharge learning needs (meds, wound care, etc )  - Arrange for interpretive services to assist at discharge as needed  - Refer to Case Management Department for coordinating discharge planning if the patient needs post-hospital services based on physician/advanced practitioner order or complex needs related to functional status, cognitive ability, or social support system  Outcome: Progressing     Problem: Knowledge Deficit  Goal: Patient/family/caregiver demonstrates understanding of disease process, treatment plan, medications, and discharge instructions  Description: Complete learning assessment and assess knowledge base    Interventions:  - Provide teaching at level of understanding  - Provide teaching via preferred learning methods  Outcome: Progressing     Problem: SKIN/TISSUE INTEGRITY - ADULT  Goal: Incision(s), wounds(s) or drain site(s) healing without S/S of infection  Description: INTERVENTIONS  - Assess and document risk factors for skin impairment   - Assess and document dressing, incision, wound bed, drain sites and surrounding tissue  - Consider nutrition services referral as needed  - Oral mucous membranes remain intact  - Provide patient/ family education  Outcome: Progressing

## 2021-05-30 NOTE — ED PROVIDER NOTES
History  Chief Complaint   Patient presents with    Abdominal Pain     RUQ abdominal pain that started at 0430 this morning  denies n/v   denies diahrrea and constipation  HPI     63year old M  Hx of T2DM  States that he was woken from sleep with RUQ abdominal pain  Does not have a hx of biliary disease  Describes his pain as crampy in nature and 8/10 in severity  Lying back exacerbates his pain  Hasn't taken anything for pain; denies nausea, vomiting  Prior to Admission Medications   Prescriptions Last Dose Informant Patient Reported? Taking? ALPRAZolam (XANAX) 1 mg tablet 5/29/2021 at Unknown time  Yes Yes   Sig: Take by mouth   ASPIRIN 81 PO 5/29/2021 at Unknown time  Yes Yes   Sig: Take by mouth   Dulaglutide (Trulicity) 9 55 XR/2 7WS SOPN 5/29/2021 at Unknown time  Yes Yes   Sig: Inject under the skin   Magnesium 400 MG TABS 5/29/2021 at Unknown time  Yes Yes   Sig: Take by mouth   atorvastatin (LIPITOR) 20 mg tablet 5/29/2021 at Unknown time  Yes Yes   Sig: Take 20 mg by mouth daily   cholecalciferol (VITAMIN D3) 1,000 units tablet 5/29/2021 at Unknown time  Yes Yes   Sig: Take 1,000 Units by mouth daily   co-enzyme Q-10 30 MG capsule 5/29/2021 at Unknown time  Yes Yes   Sig: Take 30 mg by mouth daily   fexofenadine (ALLEGRA) 180 MG tablet 5/29/2021 at Unknown time  Yes Yes   Sig: Take by mouth   fluvoxaMINE (LUVOX) 100 mg tablet 5/29/2021 at Unknown time  Yes Yes   Sig: Take 100 mg by mouth   losartan (COZAAR) 25 mg tablet 5/29/2021 at Unknown time  Yes Yes   Sig: Take 25 mg by mouth daily   metFORMIN (GLUCOPHAGE) 1000 MG tablet 5/29/2021 at Unknown time  Yes Yes   Sig: Take 1,000 mg by mouth   pantoprazole (PROTONIX) 40 mg tablet 5/29/2021 at Unknown time  Yes Yes   Sig: Take by mouth      Facility-Administered Medications: None       History reviewed  No pertinent past medical history  History reviewed  No pertinent surgical history  History reviewed  No pertinent family history    I have reviewed and agree with the history as documented  E-Cigarette/Vaping    E-Cigarette Use Never User      E-Cigarette/Vaping Substances    Nicotine No     THC No     CBD No     Flavoring No     Other No     Unknown No      Social History     Tobacco Use    Smoking status: Former Smoker     Types: Cigarettes    Smokeless tobacco: Never Used   Substance Use Topics    Alcohol use: Yes     Frequency: 2-3 times a week    Drug use: Not Currently       Review of Systems   Constitutional: Negative for chills and fever  HENT: Negative for congestion, sinus pressure and sinus pain  Eyes: Negative for pain and visual disturbance  Respiratory: Negative for chest tightness and shortness of breath  Cardiovascular: Negative for chest pain and palpitations  Gastrointestinal: Positive for abdominal pain  Negative for nausea and vomiting  Genitourinary: Negative for dysuria and flank pain  Musculoskeletal: Negative for back pain and myalgias  Skin: Negative for color change and rash  Neurological: Negative for dizziness, weakness and light-headedness  Hematological: Does not bruise/bleed easily  Psychiatric/Behavioral: Negative for confusion and decreased concentration  Physical Exam  Physical Exam  Vitals signs and nursing note reviewed  Exam conducted with a chaperone present  Constitutional:       General: He is in acute distress  HENT:      Head: Normocephalic and atraumatic  Mouth/Throat:      Mouth: Mucous membranes are moist       Pharynx: Oropharynx is clear  Eyes:      Extraocular Movements: Extraocular movements intact  Pupils: Pupils are equal, round, and reactive to light  Cardiovascular:      Rate and Rhythm: Normal rate and regular rhythm  Heart sounds: Normal heart sounds  Pulmonary:      Effort: Pulmonary effort is normal       Breath sounds: Normal breath sounds  Abdominal:      General: Abdomen is flat  Palpations: Abdomen is soft  Tenderness: There is abdominal tenderness in the right upper quadrant  There is guarding  There is no rebound  Positive signs include Daley's sign  Skin:     General: Skin is warm  Capillary Refill: Capillary refill takes less than 2 seconds  Neurological:      General: No focal deficit present  Mental Status: He is alert and oriented to person, place, and time  Cranial Nerves: No cranial nerve deficit  Motor: No weakness     Psychiatric:         Mood and Affect: Mood normal          Behavior: Behavior normal        Vital Signs  ED Triage Vitals   Temperature Pulse Respirations Blood Pressure SpO2   05/30/21 0539 05/30/21 0541 05/30/21 0541 05/30/21 0541 05/30/21 0541   98 3 °F (36 8 °C) 72 15 138/84 100 %      Temp Source Heart Rate Source Patient Position - Orthostatic VS BP Location FiO2 (%)   05/30/21 0539 -- 05/30/21 0541 05/30/21 0541 --   Temporal  Lying Right arm       Pain Score       05/30/21 0541       8           Vitals:    05/30/21 0541   BP: 138/84   Pulse: 72   Patient Position - Orthostatic VS: Lying     ED Medications  Medications   ketorolac (TORADOL) injection 15 mg (15 mg Intravenous Given 5/30/21 0554)   sodium chloride 0 9 % bolus 1,000 mL (0 mL Intravenous Stopped 5/30/21 0706)     Diagnostic Studies  Results Reviewed     Procedure Component Value Units Date/Time    Lipase [[id number]]  (Normal) Collected: 05/30/21 0608    Lab Status: Final result Specimen: Blood from Arm, Right Updated: 05/30/21 0627     Lipase 315 u/L     Basic metabolic panel [[id number]]  (Abnormal) Collected: 05/30/21 0608    Lab Status: Final result Specimen: Blood from Arm, Right Updated: 05/30/21 3708     Sodium 140 mmol/L      Potassium 4 4 mmol/L      Chloride 104 mmol/L      CO2 30 mmol/L      ANION GAP 6 mmol/L      BUN 17 mg/dL      Creatinine 1 20 mg/dL      Glucose 192 mg/dL      Calcium 9 0 mg/dL      eGFR 66 ml/min/1 73sq m     Narrative:      [place] guidelines for Chronic Kidney Disease (CKD):     Stage 1 with normal or high GFR (GFR > 90 mL/min/1 73 square meters)    Stage 2 Mild CKD (GFR = 60-89 mL/min/1 73 square meters)    Stage 3A Moderate CKD (GFR = 45-59 mL/min/1 73 square meters)    Stage 3B Moderate CKD (GFR = 30-44 mL/min/1 73 square meters)    Stage 4 Severe CKD (GFR = 15-29 mL/min/1 73 square meters)    Stage 5 End Stage CKD (GFR <15 mL/min/1 73 square meters)  Note: GFR calculation is accurate only with a steady state creatinine    Hepatic function panel [[id number]]  (Abnormal) Collected: 05/30/21 0608    Lab Status: Final result Specimen: Blood from Arm, Right Updated: 05/30/21 0627     Total Bilirubin 0 34 mg/dL      Bilirubin, Direct 0 10 mg/dL      Alkaline Phosphatase 117 U/L      AST 20 U/L      ALT 43 U/L      Total Protein 6 9 g/dL      Albumin 4 1 g/dL     CBC and differential [[id number]]  (Abnormal) Collected: 05/30/21 0608    Lab Status: Final result Specimen: Blood from Arm, Right Updated: 05/30/21 0613     WBC 8 95 Thousand/uL      RBC 4 65 Million/uL      Hemoglobin 14 7 g/dL      Hematocrit 44 3 %      MCV 95 fL      MCH 31 6 pg      MCHC 33 2 g/dL      RDW 12 3 %      MPV 9 4 fL      Platelets 460 Thousands/uL      nRBC 0 /100 WBCs      Neutrophils Relative 42 %      Immat GRANS % 0 %      Lymphocytes Relative 43 %      Monocytes Relative 9 %      Eosinophils Relative 5 %      Basophils Relative 1 %      Neutrophils Absolute 3 80 Thousands/µL      Immature Grans Absolute 0 02 Thousand/uL      Lymphocytes Absolute 3 86 Thousands/µL      Monocytes Absolute 0 78 Thousand/µL      Eosinophils Absolute 0 41 Thousand/µL      Basophils Absolute 0 08 Thousands/µL              CT abdomen pelvis wo contrast   Final Result by Santosh Hopson DO (05/30 0636)   1  Distended gallbladder with either layering small calculi versus hyperdense sludge  No CT evidence for acute cholecystitis at this time      If there is continued concern for acute cholecystitis, consider follow-up nuclear medicine HIDA scan to    evaluate for cystic duct patency  If deferred, follow-up right upper quadrant ultrasound should be obtained  Follow-up surgical consultation recommended  2   Moderate constipation  3   Hiatal hernia with reflux into the distal esophagus  Recommend follow-up GI consultation and/or upper endoscopy  4   1 mm nonobstructing calyceal calculus posterior mid pole right kidney  The study was marked in [facility] for immediate notification  Workstation performed: [id number]                Procedures  Procedures     ED Course       MDM  Number of Diagnoses or Management Options  Diagnosis management comments: 51-year-old male  Acute onset right upper quadrant abdominal pain  Positive Daley sign  Mildly elevated alk-phos  CT interpretation above  General surgery contacted advised they will evaluate the patient in the ED  The patient was signed out to Dr Signa Goldberg  Plan discussed  The patient was stable while under my care  Disposition  Final diagnoses:   Gallbladder sludge   RUQ abdominal pain     ED Disposition     None      Follow-up Information    None         Patient's Medications   Discharge Prescriptions    No medications on file     No discharge procedures on file      PDMP Review     None          ED Provider  Electronically Signed by           Andres Saldaña DO  05/30/21 5318

## 2021-05-30 NOTE — ED NOTES
Pt voided in bathroom  Pt NPO since yesterday at 1800  Pt given chrolohexidine bath per protocol and changed into new gown and socks         Ruy Guo RN  05/30/21 3361

## 2021-05-30 NOTE — ED NOTES
Pt sent to OR with antibiotic and consents  pts wife taking all belongings        Donney Goodell, NOE  05/30/21 7077

## 2021-05-31 VITALS
HEIGHT: 69 IN | TEMPERATURE: 97.4 F | HEART RATE: 71 BPM | SYSTOLIC BLOOD PRESSURE: 113 MMHG | RESPIRATION RATE: 20 BRPM | BODY MASS INDEX: 26.38 KG/M2 | OXYGEN SATURATION: 97 % | WEIGHT: 178.13 LBS | DIASTOLIC BLOOD PRESSURE: 68 MMHG

## 2021-05-31 RX ORDER — ACETAMINOPHEN 325 MG/1
650 TABLET ORAL EVERY 6 HOURS PRN
Qty: 30 TABLET | Refills: 0 | Status: SHIPPED | OUTPATIENT
Start: 2021-05-31 | End: 2021-06-07

## 2021-05-31 RX ORDER — IBUPROFEN 600 MG/1
600 TABLET ORAL EVERY 6 HOURS PRN
Qty: 30 TABLET | Refills: 0 | Status: SHIPPED | OUTPATIENT
Start: 2021-05-31

## 2021-05-31 RX ADMIN — LOSARTAN POTASSIUM 25 MG: 25 TABLET, FILM COATED ORAL at 09:19

## 2021-05-31 RX ADMIN — SODIUM CHLORIDE, SODIUM LACTATE, POTASSIUM CHLORIDE, AND CALCIUM CHLORIDE 125 ML/HR: .6; .31; .03; .02 INJECTION, SOLUTION INTRAVENOUS at 01:44

## 2021-05-31 RX ADMIN — PANTOPRAZOLE SODIUM 40 MG: 40 TABLET, DELAYED RELEASE ORAL at 06:25

## 2021-05-31 RX ADMIN — ALPRAZOLAM 1 MG: 0.5 TABLET ORAL at 00:03

## 2021-05-31 RX ADMIN — METFORMIN HYDROCHLORIDE 1000 MG: 500 TABLET ORAL at 09:19

## 2021-05-31 NOTE — NURSING NOTE
Pt verbalized understanding of discharge instructions  Tolerated breakfast without difficulty   Pt was taken to the front entrance of the hospital

## 2021-05-31 NOTE — UTILIZATION REVIEW
Initial Clinical Review    Admission: Date/Time/Statement: 5/30/2021 1127 Observation   Admission Orders (From admission, onward)     Ordered        05/30/21 1127  Place in Observation  Once                   Orders Placed This Encounter   Procedures    Place in Observation     Standing Status:   Standing     Number of Occurrences:   1     Order Specific Question:   Level of Care     Answer:   Med Surg [16]     ED Arrival Information     Expected Arrival Acuity Means of Arrival Escorted By Service Admission Type    - 5/30/2021 05:33 Urgent Walk-In Spouse Surgery-General Urgent    Arrival Complaint    flank pain         Chief Complaint   Patient presents with    Abdominal Pain     RUQ abdominal pain that started at 0430 this morning  denies n/v   denies diahrrea and constipation  Initial Presentation:  this is a 63year old male from home to ED admitted to observation due to RUQ abdominal pain/cholecystitis  Presented due to to RUQ abdominal pain which awoke him the morning of arrival   On exam abdominal tenderness RUQ, guarding   + sebastian sign  Wbc 8 95    US gallbladder showed acute cholecystitis  In the ED given Toradol, 1 liter IVF  Plan is surgery, antibiotics per operatively, post procedure monitor for pain control, IVF, diet advancement  5/30/2021 Procedure CHOLECYSTECTOMY LAPAROSCOPIC   Findings - acute cholecystitis         ED Triage Vitals   Temperature Pulse Respirations Blood Pressure SpO2   05/30/21 0539 05/30/21 0541 05/30/21 0541 05/30/21 0541 05/30/21 0541   98 3 °F (36 8 °C) 72 15 138/84 100 %      Temp Source Heart Rate Source Patient Position - Orthostatic VS BP Location FiO2 (%)   05/30/21 0539 -- 05/30/21 0541 05/30/21 0541 --   Temporal  Lying Right arm       Pain Score       05/30/21 0541       8          Wt Readings from Last 1 Encounters:   05/30/21 80 8 kg (178 lb 2 1 oz)     Additional Vital Signs:   05/31/21 02:57:45  97 3 °F (36 3 °C)Abnormal   77  15  127/75  92  96 % --  --  --  --   05/30/21 23:25:31  97 7 °F (36 5 °C)  66  15  159/91  114  97 %  --  --  --  --   05/30/21 19:07:34  97 5 °F (36 4 °C)  77  18  134/93  107  97 %  --  --  --  --   05/30/21 15:01:24  --  66  14  117/83  94  97 %  --  --  --  --   05/30/21 14:30:52  97 5 °F (36 4 °C)  62  14  112/85  94  94 %  --  None (Room air)  --  --   05/30/21 1420  97 8 °F (36 6 °C)  63  18  125/71  --  96 %  --  None (Room air)  WDL  --   05/30/21 1405  --  72  18  134/63  --  95 %  --  None (Room air)  WDL  --   05/30/21 1350  97 6 °F (36 4 °C)  74  16  144/76  --  97 %  6 L/min  Simple mask  WDL         Pertinent Labs/Diagnostic Test Results:   5/30/2021 ct abdomen - Distended gallbladder with either layering small calculi versus hyperdense sludge  No CT evidence for acute cholecystitis at this time       2  Moderate constipation  3   Hiatal hernia with reflux into the distal esophagus  Recommend follow-up GI consultation and/or upper endoscopy  4   1 mm nonobstructing calyceal calculus posterior mid pole right kidney  5/30/2021 US gallbladder - Findings suggestive of acute cholecystitis  Hepatic steatosis with small hypoechoic focus adjacent to the gallbladder fossa, probably an area of focal fatty sparing  Pancreas obscured by overlying bowel gas    Results from last 7 days   Lab Units 05/30/21  0608   WBC Thousand/uL 8 95   HEMOGLOBIN g/dL 14 7   HEMATOCRIT % 44 3   PLATELETS Thousands/uL 245   NEUTROS ABS Thousands/µL 3 80     Results from last 7 days   Lab Units 05/30/21  0608   SODIUM mmol/L 140   POTASSIUM mmol/L 4 4   CHLORIDE mmol/L 104   CO2 mmol/L 30   ANION GAP mmol/L 6   BUN mg/dL 17   CREATININE mg/dL 1 20   EGFR ml/min/1 73sq m 66   CALCIUM mg/dL 9 0     Results from last 7 days   Lab Units 05/30/21  0608   AST U/L 20   ALT U/L 43   ALK PHOS U/L 117*   TOTAL PROTEIN g/dL 6 9   ALBUMIN g/dL 4 1   TOTAL BILIRUBIN mg/dL 0 34   BILIRUBIN DIRECT mg/dL 0 10     Results from last 7 days   Lab Units 05/30/21  1408   POC GLUCOSE mg/dl 186*     Results from last 7 days   Lab Units 05/30/21  0608   GLUCOSE RANDOM mg/dL 192*     Results from last 7 days   Lab Units 05/30/21  0608   LIPASE u/L 315     ED Treatment:   Medication Administration from 05/30/2021 0533 to 05/30/2021 1212       Date/Time Order Dose Route Action Comments     05/30/2021 0554 ketorolac (TORADOL) injection 15 mg 15 mg Intravenous Given      05/30/2021 0554 sodium chloride 0 9 % bolus 1,000 mL 1,000 mL Intravenous New Bag      05/30/2021 0806 ketorolac (TORADOL) injection 15 mg 15 mg Intravenous Given         Past Medical History:   Diagnosis Date    Kidney stone      Present on Admission:  **None**      Admitting Diagnosis: Acute cholecystitis [K81 0]  Abdominal pain [R10 9]  RUQ abdominal pain [R10 11]  Gallbladder sludge [K82 8]  Age/Sex: 63 y o  male  Admission Orders:  Scheduled Medications:  losartan, 25 mg, Oral, Daily  metFORMIN, 1,000 mg, Oral, BID With Meals  pantoprazole, 40 mg, Oral, Early Morning    ceFAZolin (ANCEF) IVPB (premix in dextrose) 2,000 mg 50 mL   Dose: 2,000 mg  Freq: On call to O R  Route: IV  Start: 05/30/21 1200 End: 05/30/21 1225    Continuous IV Infusions:  lactated ringers, 125 mL/hr, Intravenous, Continuous      PRN Meds:  acetaminophen, 650 mg, Oral, Q6H PRN - used x 1 5/30/2021   ALPRAZolam, 1 mg, Oral, HS PRN - used x 1 5/31/2021   ibuprofen, 600 mg, Oral, Q6H PRN - used x 1 5/30/2021   morphine injection, 2 mg, Intravenous, Q3H PRN - used x 1 5/30/2021           Network Utilization Review Department  ATTENTION: Please call with any questions or concerns to [phone] and carefully listen to the prompts so that you are directed to the right person  All voicemails are confidential   [facility] all requests for admission clinical reviews, approved or denied determinations and any other requests to dedicated fax number below belonging to the campus where the patient is receiving treatment   List of dedicated fax numbers for the Facilities:  [address] DENIALS (Administrative/Medical Necessity) [phone]   [address]  (Maternity/NICU/Pediatrics) [address] [facility] [address] [place] [address] [address] [id number] [place] [address] [address] [facility] [address] [employer] [phone]

## 2021-05-31 NOTE — DISCHARGE SUMMARY
Discharge Summary - Jarad Rodriguez 63 y o  male MRN: [id number]    Unit/Bed#: -01 Encounter: [id number]    Admission Date:   Admission Orders (From admission, onward)     Ordered        05/30/21 1127  Place in Observation  Once                     Admitting Diagnosis: Acute cholecystitis [K81 0]  Abdominal pain [R10 9]  RUQ abdominal pain [R10 11]  Gallbladder sludge [K82 8]    HPI:  Jarad Rodriguez is a 63 y o  male who presents with right upper quadrant abdominal pain  The patient states he was awoken this morning by severe cramping abdominal pain in the right upper quadrant  He has never had a pain like this before  He has had kidney stones however he felt this pain was different  He denies any nausea or vomiting  In the past, fatty foods have caused diarrhea  Procedures Performed: CHOLECYSTECTOMY LAPAROSCOPIC     Summary of Hospital Course: Presented to ED with above complaints  Transported to OR and underwent above procedure  Tolerated surgery well  Morning of POD1 was tolerating clear liquids  Urinating without issue  Passing gas  Has been OOB ambulating without issue  Denies fevers/chills  Denies chest pain or short of breath  Subsequently discharged home  Will call [id number] [facility] office to schedule 2 week post op visit with YANE Leggett  Significant Findings, Care, Treatment and Services Provided:  Operative Findings:  Acute cholecystitis    Complications: none    Discharge Diagnosis: Acute cholecystitis [K81 0]    Condition at Discharge: good     Discharge instructions/Information to patient and family:   See after visit summary for information provided to patient and family  Provisions for Follow-Up Care:  See after visit summary for information related to follow-up care and any pertinent home health orders  PCP: No primary care provider on file  Disposition: Home    Planned Readmission: No    Discharge Statement   I spent 30 minutes discharging the patient   This time was spent on the day of discharge  I had direct contact with the patient on the day of discharge  Additional documentation is required if more than 30 minutes were spent on discharge  Discharge Medications:  See after visit summary for reconciled discharge medications provided to patient and family        Jeanine Sanford PA-C

## 2021-05-31 NOTE — PROGRESS NOTES
Progress Note - General Surgery   Drew Turner 63 y o  male MRN: [id number]  Unit/Bed#: -01 Encounter: [id number]    Assessment:  POD1 s/p lap keshiaey  Afebrile  Tolerating clear liquid diet  Pain controlled    Plan:  Advance to regular diet  Discharge home today  Follow up in [address] [place], [place] [address]y [address] office with Sandra Salgado in 2 weeks    Subjective/Objective     Subjective: No acute events overnight  Tolerating clear liquids  Urinating without issue  Passing gas  Has been OOB ambulating without issue  Denies fevers/chills  Denies chest pain or short of breath  Objective:    Blood pressure 113/68, pulse 71, temperature (!) 97 4 °F (36 3 °C), resp  rate 20, height 5' 9" (1 753 m), weight 80 8 kg (178 lb 2 1 oz), SpO2 97 %  ,Body mass index is 26 31 kg/m²  Intake/Output Summary (Last 24 hours) at 5/31/2021 0826  Last data filed at 5/31/2021 5030  Gross per 24 hour   Intake 2879 58 ml   Output 2250 ml   Net 629 58 ml       Invasive Devices     Peripheral Intravenous Line            Peripheral IV 05/30/21 Right Antecubital 1 day                Physical Exam:   Gen: AxOx3  Heart: RRR  Lungs: CTA  Abd: soft, minimal incisional tenderness  Non distended  Bowel sounds present  Incisions are well approximated, glue in place        VTE Mechanical Prophylaxis: sequential compression device     Vishal Fischer PA-C

## 2021-05-31 NOTE — PLAN OF CARE
Problem: PAIN - ADULT  Goal: Verbalizes/displays adequate comfort level or baseline comfort level  Description: Interventions:  - Encourage patient to monitor pain and request assistance  - Assess pain using appropriate pain scale  - Administer analgesics based on type and severity of pain and evaluate response  - Implement non-pharmacological measures as appropriate and evaluate response  - Consider cultural and social influences on pain and pain management  - Notify physician/advanced practitioner if interventions unsuccessful or patient reports new pain  Outcome: Progressing     Problem: INFECTION - ADULT  Goal: Absence or prevention of progression during hospitalization  Description: INTERVENTIONS:  - Assess and monitor for signs and symptoms of infection  - Monitor lab/diagnostic results  - Monitor all insertion sites, i e  indwelling lines, tubes, and drains  - Monitor endotracheal if appropriate and nasal secretions for changes in amount and color  - [place] appropriate cooling/warming therapies per order  - Administer medications as ordered  - Instruct and encourage patient and family to use good hand hygiene technique  - Identify and instruct in appropriate isolation precautions for identified infection/condition  Outcome: Progressing     Problem: SAFETY ADULT  Goal: Patient will remain free of falls  Description: INTERVENTIONS:  - Assess patient frequently for physical needs  -  Identify cognitive and physical deficits and behaviors that affect risk of falls    -  [place] fall precautions as indicated by assessment   - Educate patient/family on patient safety including physical limitations  - Instruct patient to call for assistance with activity based on assessment  - Modify environment to reduce risk of injury  - Consider OT/PT consult to assist with strengthening/mobility  Outcome: Progressing  Goal: Maintain or return to baseline ADL function  Description: INTERVENTIONS:  -  Assess patient's ability to carry out ADLs; assess patient's baseline for ADL function and identify physical deficits which impact ability to perform ADLs (bathing, care of mouth/teeth, toileting, grooming, dressing, etc )  - Assess/evaluate cause of self-care deficits   - Assess range of motion  - Assess patient's mobility; develop plan if impaired  - Assess patient's need for assistive devices and provide as appropriate  - Encourage maximum independence but intervene and supervise when necessary  - Involve family in performance of ADLs  - Assess for home care needs following discharge   - Consider OT consult to assist with ADL evaluation and planning for discharge  - Provide patient education as appropriate  Outcome: Progressing  Goal: Maintain or return mobility status to optimal level  Description: INTERVENTIONS:  - Assess patient's baseline mobility status (ambulation, transfers, stairs, etc )    - Identify cognitive and physical deficits and behaviors that affect mobility  - Identify mobility aids required to assist with transfers and/or ambulation (gait belt, sit-to-stand, lift, walker, cane, etc )  - [place] fall precautions as indicated by assessment  - Record patient progress and toleration of activity level on Mobility SBAR; progress patient to next Phase/Stage  - Instruct patient to call for assistance with activity based on assessment  - Consider rehabilitation consult to assist with strengthening/weightbearing, etc   Outcome: Progressing     Problem: DISCHARGE PLANNING  Goal: Discharge to home or other facility with appropriate resources  Description: INTERVENTIONS:  - Identify barriers to discharge w/patient and caregiver  - Arrange for needed discharge resources and transportation as appropriate  - Identify discharge learning needs (meds, wound care, etc )  - Arrange for interpretive services to assist at discharge as needed  - Refer to Case Management Department for coordinating discharge planning if the patient needs post-hospital services based on physician/advanced practitioner order or complex needs related to functional status, cognitive ability, or social support system  Outcome: Progressing     Problem: Knowledge Deficit  Goal: Patient/family/caregiver demonstrates understanding of disease process, treatment plan, medications, and discharge instructions  Description: Complete learning assessment and assess knowledge base    Interventions:  - Provide teaching at level of understanding  - Provide teaching via preferred learning methods  Outcome: Progressing     Problem: SKIN/TISSUE INTEGRITY - ADULT  Goal: Incision(s), wounds(s) or drain site(s) healing without S/S of infection  Description: INTERVENTIONS  - Assess and document risk factors for skin impairment   - Assess and document dressing, incision, wound bed, drain sites and surrounding tissue  - Consider nutrition services referral as needed  - Oral mucous membranes remain intact  - Provide patient/ family education  Outcome: Progressing

## 2021-06-03 ENCOUNTER — HOSPITAL ENCOUNTER (EMERGENCY)
Facility: HOSPITAL | Age: 61
Discharge: HOME | End: 2021-06-04
Attending: EMERGENCY MEDICINE | Admitting: EMERGENCY MEDICINE
Payer: COMMERCIAL

## 2021-06-03 ENCOUNTER — APPOINTMENT (EMERGENCY)
Dept: CT IMAGING | Facility: HOSPITAL | Age: 61
End: 2021-06-03
Payer: COMMERCIAL

## 2021-06-03 DIAGNOSIS — I10 HYPERTENSION: Primary | ICD-10-CM

## 2021-06-03 PROCEDURE — 99284 EMERGENCY DEPT VISIT MOD MDM: CPT

## 2021-06-03 PROCEDURE — 93005 ELECTROCARDIOGRAM TRACING: CPT

## 2021-06-03 PROCEDURE — 70450 CT HEAD/BRAIN W/O DYE: CPT

## 2021-06-03 PROCEDURE — 99285 EMERGENCY DEPT VISIT HI MDM: CPT | Performed by: EMERGENCY MEDICINE

## 2021-06-03 RX ORDER — CLONIDINE HYDROCHLORIDE 0.1 MG/1
0.1 TABLET ORAL ONCE
Status: COMPLETED | OUTPATIENT
Start: 2021-06-03 | End: 2021-06-03

## 2021-06-03 RX ADMIN — CLONIDINE HYDROCHLORIDE 0.1 MG: 0.1 TABLET ORAL at 23:26

## 2021-06-04 VITALS
TEMPERATURE: 96.9 F | WEIGHT: 173.72 LBS | HEART RATE: 74 BPM | DIASTOLIC BLOOD PRESSURE: 94 MMHG | BODY MASS INDEX: 25.73 KG/M2 | SYSTOLIC BLOOD PRESSURE: 142 MMHG | OXYGEN SATURATION: 95 % | HEIGHT: 69 IN | RESPIRATION RATE: 20 BRPM

## 2021-06-04 LAB
ATRIAL RATE: 78 BPM
P AXIS: 44 DEGREES
PR INTERVAL: 138 MS
QRS AXIS: 86 DEGREES
QRSD INTERVAL: 84 MS
QT INTERVAL: 382 MS
QTC INTERVAL: 435 MS
T WAVE AXIS: 62 DEGREES
VENTRICULAR RATE: 78 BPM

## 2021-06-04 NOTE — ED PROVIDER NOTES
History  Chief Complaint   Patient presents with    High Blood Pressure     pt states 20 minutes ago he checked his BP at home and it was 150/110  also c/o headache      The patient is a 77-year-old male history of hypertension diabetes recent cholecystectomy and recently underwent exercise cardiac stress test today and echocardiogram which was ordered for chest discomfort he was complaining of about a month ago  Patient was told that the results of his testing today at outside hospital was normal   Patient reports he was checking his blood pressure tonight and found to be elevated at 160/110 and was having associated pressure in his head and consents that his blood pressure was high  No numbness or weakness in the body no slurred speech or facial droop no visual changes  Headache described as dull pressure  No chest pain or shortness of breath  Patient does report some mild palpitations  No missed doses of antihypertensive medications  History provided by:  Patient and spouse  Hypertension  Severity:  Moderate  Onset quality:  Sudden  Duration:  3 hours  Timing:  Constant  Progression:  Improving  Chronicity:  Recurrent  Associated symptoms: anxiety, headaches and palpitations    Associated symptoms: no abdominal pain, no chest pain, no dizziness, no ear pain, no fatigue, no fever, no hematuria, no nausea, no shortness of breath, not vomiting and no weakness        Prior to Admission Medications   Prescriptions Last Dose Informant Patient Reported? Taking?    ALPRAZolam (XANAX) 1 mg tablet   Yes No   Sig: Take by mouth   ASPIRIN 81 PO   Yes No   Sig: Take by mouth   Dulaglutide (Trulicity) 6 77 MP/8 5WW SOPN   Yes No   Sig: Inject under the skin   Magnesium 400 MG TABS   Yes No   Sig: Take by mouth   acetaminophen (TYLENOL) 325 mg tablet   No No   Sig: Take 2 tablets (650 mg total) by mouth every 6 (six) hours as needed for mild pain for up to 7 days   atorvastatin (LIPITOR) 20 mg tablet   Yes No Sig: Take 20 mg by mouth daily   cholecalciferol (VITAMIN D3) 1,000 units tablet   Yes No   Sig: Take 1,000 Units by mouth daily   co-enzyme Q-10 30 MG capsule   Yes No   Sig: Take 30 mg by mouth daily   fexofenadine (ALLEGRA) 180 MG tablet   Yes No   Sig: Take by mouth   fluvoxaMINE (LUVOX) 100 mg tablet   Yes No   Sig: Take 100 mg by mouth   ibuprofen (MOTRIN) 600 mg tablet   No No   Sig: Take 1 tablet (600 mg total) by mouth every 6 (six) hours as needed for moderate pain   losartan (COZAAR) 25 mg tablet   Yes No   Sig: Take 25 mg by mouth daily   metFORMIN (GLUCOPHAGE) 1000 MG tablet   Yes No   Sig: Take 1,000 mg by mouth   pantoprazole (PROTONIX) 40 mg tablet   Yes No   Sig: Take by mouth      Facility-Administered Medications: None       Past Medical History:   Diagnosis Date    Diabetes mellitus ([facility] [address] )     Hypertension     Kidney stone        Past Surgical History:   Procedure Laterality Date    CHOLECYSTECTOMY LAPAROSCOPIC N/A 5/30/2021    Procedure: CHOLECYSTECTOMY LAPAROSCOPIC;  Surgeon: Merle Coe DO;  Location: [facility] OR;  Service: General       History reviewed  No pertinent family history  I have reviewed and agree with the history as documented  E-Cigarette/Vaping    E-Cigarette Use Never User      E-Cigarette/Vaping Substances    Nicotine No     THC No     CBD No     Flavoring No     Other No     Unknown No      Social History     Tobacco Use    Smoking status: Former Smoker     Types: Cigarettes    Smokeless tobacco: Never Used   Substance Use Topics    Alcohol use: Yes     Frequency: 4 or more times a week     Drinks per session: 1 or 2    Drug use: Not Currently       Review of Systems   Constitutional: Negative for activity change, appetite change, chills, fatigue and fever  HENT: Negative for congestion, ear pain, rhinorrhea and sore throat  Eyes: Negative for discharge, redness and visual disturbance     Respiratory: Negative for cough, chest tightness, shortness of breath and wheezing  Cardiovascular: Positive for palpitations  Negative for chest pain  Gastrointestinal: Negative for abdominal pain, constipation, diarrhea, nausea and vomiting  Endocrine: Negative for polydipsia and polyuria  Genitourinary: Negative for difficulty urinating, dysuria, frequency, hematuria and urgency  Musculoskeletal: Negative for arthralgias and myalgias  Skin: Negative for color change, pallor and rash  Neurological: Positive for headaches  Negative for dizziness, weakness, light-headedness and numbness  Hematological: Negative for adenopathy  Does not bruise/bleed easily  Psychiatric/Behavioral: The patient is nervous/anxious  All other systems reviewed and are negative  Physical Exam  Physical Exam  Vitals signs and nursing note reviewed  Constitutional:       Appearance: He is well-developed  HENT:      Nose: Nose normal       Mouth/Throat:      Pharynx: No oropharyngeal exudate  Eyes:      General: No scleral icterus  Conjunctiva/sclera: Conjunctivae normal       Pupils: Pupils are equal, round, and reactive to light  Neck:      Musculoskeletal: Normal range of motion and neck supple  Vascular: No JVD  Trachea: No tracheal deviation  Cardiovascular:      Rate and Rhythm: Normal rate and regular rhythm  Heart sounds: Normal heart sounds  No murmur  Pulmonary:      Effort: Pulmonary effort is normal  No respiratory distress  Breath sounds: Normal breath sounds  No wheezing or rales  Abdominal:      General: Bowel sounds are normal       Palpations: Abdomen is soft  Tenderness: There is no abdominal tenderness  There is no guarding  Musculoskeletal: Normal range of motion  General: No tenderness  Skin:     General: Skin is warm and dry  Neurological:      Mental Status: He is alert and oriented to person, place, and time  Cranial Nerves: No cranial nerve deficit        Sensory: No sensory deficit  Motor: No abnormal muscle tone  Comments: 5/5 motor, nl sens   Psychiatric:         Behavior: Behavior normal          Vital Signs  ED Triage Vitals [06/03/21 2310]   Temperature Pulse Respirations Blood Pressure SpO2   (!) 96 9 °F (36 1 °C) 69 16 (!) 176/104 97 %      Temp src Heart Rate Source Patient Position - Orthostatic VS BP Location FiO2 (%)   -- Monitor Sitting Right arm --      Pain Score       7           Vitals:    06/03/21 2310 06/03/21 2315 06/03/21 2326 06/04/21 0000   BP: (!) 176/104 160/99 (!) 144/101 149/94   Pulse: 69 69  75   Patient Position - Orthostatic VS: Sitting Sitting  Sitting         Visual Acuity  Visual Acuity      Most Recent Value   L Pupil Size (mm)  3   R Pupil Size (mm)  3          ED Medications  Medications   cloNIDine (CATAPRES) tablet 0 1 mg (0 1 mg Oral Given 6/3/21 2326)       Diagnostic Studies  Results Reviewed     None                 CT head without contrast   Final Result by Rosa Marcum DO (06/03 2357)      No acute intracranial abnormality is seen  Workstation performed: [id number]                    Procedures  ECG 12 Lead Documentation Only    Date/Time: 6/3/2021 11:16 PM  Performed by: Parul Churchill DO  Authorized by:  Parul Churchill DO     ECG reviewed by me, the ED Provider: yes    Patient location:  ED  Previous ECG:     Comparison to cardiac monitor: Yes    Quality:     Tracing quality:  Limited by artifact  Rate:     ECG rate:  78    ECG rate assessment: normal    Rhythm:     Rhythm: sinus rhythm    Ectopy:     Ectopy: PAC    QRS:     QRS axis:  Normal    QRS intervals:  Normal  Conduction:     Conduction: normal    ST segments:     ST segments:  Normal  T waves:     T waves: normal               ED Course                                           MDM  Number of Diagnoses or Management Options  Hypertension:   Diagnosis management comments: Patient remained clinically hemodynamically and neurologically stable in the emergency department he is essentially asymptomatic with the exception of head pressure which improved with rest and antihypertensive medication given ED when blood pressure improved  Workup in the ED reveals no evidence of acute cardiac or intracranial pathology  Discussed recent stress test and echocardiogram results with the patient from today that were reviewed with limited availability in Care everywhere patient understands and is aware that there was a recommendation that he have additional cardiac testing such as nuclear exercise stress test or exercise stress echo  Patient reports that he was told by Cardiology that his echocardiogram was normal but that he was recommended additional testing on his heart, stress echocardiogram to be set up in the future  Patient remained stable at this point is asymptomatic blood pressure improved no evidence of hypertensive emergency or hypertensive urgency present in the emergency department patient understands the need for prompt follow-up with his primary physician to arrange and schedule additional testing and advised prompt follow-up for re-evaluation of elevated blood pressure and further management and treatment for this  Return precautions and anticipatory guidance discussed           Amount and/or Complexity of Data Reviewed  Tests in the radiology section of CPT®: ordered and reviewed  Tests in the medicine section of CPT®: ordered and reviewed  Decide to obtain previous medical records or to obtain history from someone other than the patient: yes  Review and summarize past medical records: yes  Independent visualization of images, tracings, or specimens: yes    Risk of Complications, Morbidity, and/or Mortality  Presenting problems: moderate  Diagnostic procedures: moderate  Management options: moderate    Patient Progress  Patient progress: stable      Disposition  Final diagnoses:   Hypertension     Time reflects when diagnosis was documented in both MDM as applicable and the Disposition within this note     Time User Action Codes Description Comment    6/4/2021 12:00 AM Emanuel Gerardo Add [I10] Hypertension       ED Disposition     ED Disposition Condition Date/Time Comment    Discharge Stable Fri Jun 4, 2021 12:00 AM Hilary Dinora discharge to home/self care  Follow-up Information     Follow up With Specialties Details Why Contact Info      Schedule an appointment as soon as possible for a visit in 3 days  Your primary care physician          Patient's Medications   Discharge Prescriptions    No medications on file     No discharge procedures on file      PDMP Review       Value Time User    PDMP Reviewed  Yes 5/31/2021  8:34 AM Jona Elena PA-C          ED Provider  Electronically Signed by               Josefa Yip DO  06/04/21 0011

## 2022-06-14 NOTE — OP NOTE
OPERATIVE REPORT  PATIENT NAME: Rashawn Quinonez    :  1960  MRN: [id number]  Pt Location: [facility] OR ROOM 02    SURGERY DATE: 2021    Surgeon(s) and Role:     * Margurette Siemens, DO - Primary     * Whitley Cano PA-C - Assisting    Preop Diagnosis:  Acute cholecystitis [K81 0]    Post-Op Diagnosis Codes:     * Acute cholecystitis [K81 0]    Procedure(s) (LRB):  CHOLECYSTECTOMY LAPAROSCOPIC (N/A)    Specimen(s):  ID Type Source Tests Collected by Time Destination   1 : gallbladder Tissue Gallbladder TISSUE EXAM Margurette SiemensDO 2021  1:08 PM        Estimated Blood Loss:   Minimal    Drains:  * No LDAs found *    Anesthesia Type:   General    Operative Indications:  Acute cholecystitis [K81 0]      Operative Findings:  Acute cholecystitis    Complications:   None    Procedure and Technique:  The patient was brought to the operating room  A time-out was held and the patient identified and procedure verified  Appropriate prophylaxis and DVT prophylaxis was used  General anesthesia was administered  The area of the abdomen is prepped and draped in the usual sterile fashion  Local anesthesia using 0 25% Marcaine with epinephrine was infiltrated in the supraumbilical area  A small incision was made using 11 blade scalpel  The skin was grasped and elevated using towel clamps  A Veress needle was placed and confirmed to be intraperitoneal using a saline drop test   The abdomen was insufflated to 15 mmHg intraperitoneal pressure  A 5 mm trocar was placed  The abdomen was inspected and found to be free of adhesions  An additional 11 mm trocar was placed in the epigastric area this was done after infiltration of local anesthesia and under direct visualization  Two additional 5 mm trocars were placed in the right upper quadrant in the same fashion  The patient was placed in reverse Trendelenburg position and rotated towards the left side    The fundus of the gallbladder was grasped and elevated Spoke with Elena regarding her thyroid ultrasound results  Will order ultrasound guided biopsy of the nodule noted in the left lower pole  Patient is agreeable with this  Questions and concerns answered will follow-up with patient after procedure  anteriorly and superiorly  Any adhesions to the gallbladder were taken down using blunt dissection and electrocautery  Munira's pouch was identified  The peritoneum surrounding Munira's pouch was incised  The cystic duct was identified  This was freed of all surrounding tissue  The cystic artery was also freed of all surrounding tissue  The critical view was obtained  Two clips were placed distally and 1 proximally on the cystic duct  The duct was divided between clips  The cystic artery was clipped and divided in the same manner  Hook electrocautery was then used to free the gallbladder from the hepatic fossa  The gallbladder was placed within a 10 mm Endo-Catch bag and removed through the epigastric port site  The hepatic fossa was inspected  Hemostasis was achieved using electrocautery  Fascia of the 11 mm trocar site was closed using a suture of 0 Vicryl with the laparoscopic suture Passer  All trocars were removed and the abdomen was desufflated  Skin was closed using 4 0 Vicryl in the subcuticular layer  All incisions were covered with skin glue  The patient tolerated the procedure well was transferred to recovery in stable condition  At the end the procedure all needle instrument sponge counts were correct x2     I was present for the entire procedure and A physician assistant was required during the procedure for retraction tissue handling,dissection and suturing    Patient Disposition:  PACU     SIGNATURE: Joel Albarran DO  DATE: May 30, 2021  TIME: 1:34 PM

## 2023-05-08 ENCOUNTER — EVALUATION (OUTPATIENT)
Dept: PHYSICAL THERAPY | Facility: CLINIC | Age: 63
End: 2023-05-08

## 2023-05-08 DIAGNOSIS — M54.41 ACUTE RIGHT-SIDED LOW BACK PAIN WITH RIGHT-SIDED SCIATICA: Primary | ICD-10-CM

## 2023-05-08 NOTE — PROGRESS NOTES
PT Evaluation     Today's date: 2023  Patient name: Lazarus Templeton  : 1960  MRN: [id number]  Referring provider: No ref  provider found  Dx:   Encounter Diagnosis     ICD-10-CM    1  Acute right-sided low back pain with right-sided sciatica  M54 41                  Assessment  Assessment details: Lazarus Templeton is a pleasant 66 y o  male presenting to PT with cc of acute low back pain  Pt  States pain began approx 6 days ago when lifting a trailed hitch into place and losing control of it  Upon examination, patient was found to have objective deficits as listed below and is displaying ss consistent with L5/S1 lumbar radiculopathy with (+) SIJ cluster testing  No red flags present  Pt  Is experiencing subsequent functional deficits including pain and difficulty walking, standing, and navigating stairs  Pt  Was educated on role of PT to address issues and given initial treatment with HEP  Pt  Would benefit from skilled physical therapy to promote improved function and maximize activity tolerance  Symptom irritability: highUnderstanding of Dx/Px/POC: good  Goals  ST weeks  -Pt  Will demonstrate L/S flexion AROM improvement of 25%  -Pt  Will demonstrate improved core stabilizer contraction, promoting improved muscle balance  LT weeks  -Pt  Will demonstrate ability to walk 1 mile without pain, demonstrating improved functional mobility   -Pt  Will demonstrate L/S AROM WNL  -Pt  Will demonstrate I with HEP upon DC  -Pt  Will demonstrate ability to effectively contract core mm with appropriate strength to perform lifting task     Plan  Patient would benefit from: skilled physical therapy  Planned modality interventions: cryotherapy, high voltage pulsed current: spasm management, high voltage pulsed current: pain management, thermotherapy: hydrocollator packs and unattended electrical stimulation  Planned therapy interventions: abdominal trunk stabilization, cognitive skills, functional ROM exercises, home exercise program, therapeutic training, therapeutic exercise, therapeutic activities, stretching, strengthening, postural training, neuromuscular re-education, motor coordination training, manual therapy, joint mobilization and massage  Frequency: 2x week  Duration in weeks: 6  Plan of Care beginning date:23   Plan of Care expiration date: 23  Treatment plan discussed with: patient         Subjective Evaluation     History of Present Illness  Mechanism of injury: Jovani Bauman presents to PT with cc of acute worsening R sided low back pain  He experienced pain on  when lifting his trailer hitch into place and losing control of it  He notes experiencing similar pain 20 years ago which improved with chiropractic treatment  Pt  Received chiro care on Wednesday which he feels worsened it  He f/u with PCP and has since been referred to PT and also began mm relaxer  He states pain is at R PSIS and radiates into buttocks/lateral hip  He has tried some exercises a bit which he notes is helpful for a moment  He denies any n/t  Denies any weakness  Denies any bowel/bladder changes  Pain  Current pain ratin  At best pain ratin  At worst pain rating: 10  Location:  paraspinals, R PSIS  Quality: tight, sharp, pressure stabbing  Relieving factors: rest, ice, heat and medications  Aggravating factors: walking, standing, sitting, stair climbing, lifting and running  Progression: worsening     Social Support  Steps to enter house: yes  Stairs in house: yes      Employment status: full time self employed lawn care  Treatments  Previous Treatment: chiro, mm relaxer  Current Treatment: mm relaxer        Objective      Concurrent Complaints  Negative for night pain, disturbed sleep, bladder dysfunction, bowel dysfunction and saddle (S4) numbness      Postural Observations  Seated posture: fair  Standing posture: fair  R sided trunk lean           Palpation   Left   Tenderness of the erector spinae  "  Right   Muscle spasm in the quadratus lumborum, g;uteals, paraspinals  Tenderness of the erector spinae and quadratus lumborum  Tenderness      Lumbar Spine  Tenderness in the spinous process  Left Hip   Tenderness in the PSIS        Neurological Testing      Sensation      Lumbar   Left   Intact: light touch     Right   Intact: light touch     Reflexes   Left   Patellar (L4): brisk (2+)     Right   Patellar (L4): brisk (2+)     Active Range of Motion      Lumbar   Flexion:  with pain Restriction level: moderate  Extension:  WFL  Left lateral flexion:  with pain Restriction level: moderate  Right lateral flexion:  with pain Restriction level: severe  Left rotation:  WFL  Right rotation:  with pain restriction level severe     Strength/Myotome Testing      Lumbar   Left   Normal strength     Right   Normal strength     Tests      Lumbar      Left   Negative crossed SLR  Positive Passive SLR     Right   Negative crossed SLR   Positive Passive SLR    Repeated Motions:  No centralization or peripheralization of symptoms     General Comments:     Lower quarter screen   Hips: unremarkable  Knees: unremarkable  Foot/ankle: unremarkable    Precautions: None    Daily Treatment Diary     Manual  5/8            Pelvic Rocking -            Sacral Rocking -            L/S STM -            Piriformis TpR -                             Exercise Diary  5/8            Warm up  mhp 5'            LTR 10x10\"            SKTC 10x10\"            TrA with gluteal iso 20x            PPT -            Marches with TrA -            Clamshells -            Ball squeeze -            90/90 stretch 10x5\"            Piriformis Stretch 4x30\"            Squats with TrA -            Marches with TrA -            PB marches -            Bridges -            Cat/Cow 10x            [employer] -                                                                    Modalities  5/8            IFC with MHP 15 min                                    " Flowsheet Rows    Flowsheet Row Most Recent Value   PT/OT G-Codes    Current Score 53   Projected Score 72   FOTO information reviewed Yes

## 2023-10-01 ENCOUNTER — APPOINTMENT (EMERGENCY)
Dept: RADIOLOGY | Facility: HOSPITAL | Age: 63
End: 2023-10-01
Payer: COMMERCIAL

## 2023-10-01 ENCOUNTER — HOSPITAL ENCOUNTER (EMERGENCY)
Facility: HOSPITAL | Age: 63
Discharge: HOME | End: 2023-10-01
Attending: EMERGENCY MEDICINE | Admitting: EMERGENCY MEDICINE
Payer: COMMERCIAL

## 2023-10-01 VITALS
SYSTOLIC BLOOD PRESSURE: 134 MMHG | HEART RATE: 66 BPM | DIASTOLIC BLOOD PRESSURE: 84 MMHG | BODY MASS INDEX: 27.1 KG/M2 | HEIGHT: 69 IN | RESPIRATION RATE: 18 BRPM | OXYGEN SATURATION: 95 % | WEIGHT: 182.98 LBS | TEMPERATURE: 98 F

## 2023-10-01 DIAGNOSIS — E83.42 HYPOMAGNESEMIA: ICD-10-CM

## 2023-10-01 DIAGNOSIS — R07.89 ATYPICAL CHEST PAIN: Primary | ICD-10-CM

## 2023-10-01 LAB
ALBUMIN SERPL BCP-MCNC: 4.4 G/DL (ref 3.5–5)
ALP SERPL-CCNC: 58 U/L (ref 34–104)
ALT SERPL W P-5'-P-CCNC: 31 U/L (ref 7–52)
ANION GAP SERPL CALCULATED.3IONS-SCNC: 10 MMOL/L
APTT PPP: 22 SECONDS (ref 23–37)
AST SERPL W P-5'-P-CCNC: 24 U/L (ref 13–39)
BASOPHILS # BLD AUTO: 0.07 THOUSANDS/ÂΜL (ref 0–0.1)
BASOPHILS NFR BLD AUTO: 1 % (ref 0–1)
BILIRUB SERPL-MCNC: 0.7 MG/DL (ref 0.2–1)
BUN SERPL-MCNC: 18 MG/DL (ref 5–25)
CALCIUM SERPL-MCNC: 9.4 MG/DL (ref 8.4–10.2)
CARDIAC TROPONIN I PNL SERPL HS: <2 NG/L
CARDIAC TROPONIN I PNL SERPL HS: <2 NG/L
CHLORIDE SERPL-SCNC: 108 MMOL/L (ref 96–108)
CO2 SERPL-SCNC: 23 MMOL/L (ref 21–32)
CREAT SERPL-MCNC: 1.07 MG/DL (ref 0.6–1.3)
EOSINOPHIL # BLD AUTO: 0.28 THOUSAND/ÂΜL (ref 0–0.61)
EOSINOPHIL NFR BLD AUTO: 2 % (ref 0–6)
ERYTHROCYTE [DISTWIDTH] IN BLOOD BY AUTOMATED COUNT: 12.2 % (ref 11.6–15.1)
GFR SERPL CREATININE-BSD FRML MDRD: 74 ML/MIN/1.73SQ M
GLUCOSE SERPL-MCNC: 90 MG/DL (ref 65–140)
HCT VFR BLD AUTO: 40.9 % (ref 36.5–49.3)
HGB BLD-MCNC: 14.3 G/DL (ref 12–17)
IMM GRANULOCYTES # BLD AUTO: 0.04 THOUSAND/UL (ref 0–0.2)
IMM GRANULOCYTES NFR BLD AUTO: 0 % (ref 0–2)
INR PPP: 0.96 (ref 0.84–1.19)
LACTATE SERPL-SCNC: 1.4 MMOL/L (ref 0.5–2)
LIPASE SERPL-CCNC: 49 U/L (ref 11–82)
LYMPHOCYTES # BLD AUTO: 2.54 THOUSANDS/ÂΜL (ref 0.6–4.47)
LYMPHOCYTES NFR BLD AUTO: 21 % (ref 14–44)
MAGNESIUM SERPL-MCNC: 1.4 MG/DL (ref 1.9–2.7)
MCH RBC QN AUTO: 32.8 PG (ref 26.8–34.3)
MCHC RBC AUTO-ENTMCNC: 35 G/DL (ref 31.4–37.4)
MCV RBC AUTO: 94 FL (ref 82–98)
MONOCYTES # BLD AUTO: 0.8 THOUSAND/ÂΜL (ref 0.17–1.22)
MONOCYTES NFR BLD AUTO: 7 % (ref 4–12)
NEUTROPHILS # BLD AUTO: 8.54 THOUSANDS/ÂΜL (ref 1.85–7.62)
NEUTS SEG NFR BLD AUTO: 69 % (ref 43–75)
NRBC BLD AUTO-RTO: 0 /100 WBCS
PLATELET # BLD AUTO: 256 THOUSANDS/UL (ref 149–390)
PMV BLD AUTO: 8.9 FL (ref 8.9–12.7)
POTASSIUM SERPL-SCNC: 3.6 MMOL/L (ref 3.5–5.3)
PROT SERPL-MCNC: 7 G/DL (ref 6.4–8.4)
PROTHROMBIN TIME: 13 SECONDS (ref 11.6–14.5)
RBC # BLD AUTO: 4.36 MILLION/UL (ref 3.88–5.62)
SODIUM SERPL-SCNC: 141 MMOL/L (ref 135–147)
WBC # BLD AUTO: 12.27 THOUSAND/UL (ref 4.31–10.16)

## 2023-10-01 PROCEDURE — 71045 X-RAY EXAM CHEST 1 VIEW: CPT

## 2023-10-01 PROCEDURE — 93005 ELECTROCARDIOGRAM TRACING: CPT

## 2023-10-01 PROCEDURE — 96365 THER/PROPH/DIAG IV INF INIT: CPT

## 2023-10-01 PROCEDURE — 84484 ASSAY OF TROPONIN QUANT: CPT | Performed by: EMERGENCY MEDICINE

## 2023-10-01 PROCEDURE — 80053 COMPREHEN METABOLIC PANEL: CPT | Performed by: EMERGENCY MEDICINE

## 2023-10-01 PROCEDURE — 83605 ASSAY OF LACTIC ACID: CPT | Performed by: EMERGENCY MEDICINE

## 2023-10-01 PROCEDURE — 99285 EMERGENCY DEPT VISIT HI MDM: CPT | Performed by: EMERGENCY MEDICINE

## 2023-10-01 PROCEDURE — 36415 COLL VENOUS BLD VENIPUNCTURE: CPT | Performed by: EMERGENCY MEDICINE

## 2023-10-01 PROCEDURE — 85730 THROMBOPLASTIN TIME PARTIAL: CPT | Performed by: EMERGENCY MEDICINE

## 2023-10-01 PROCEDURE — 83735 ASSAY OF MAGNESIUM: CPT | Performed by: EMERGENCY MEDICINE

## 2023-10-01 PROCEDURE — 96375 TX/PRO/DX INJ NEW DRUG ADDON: CPT

## 2023-10-01 PROCEDURE — 85610 PROTHROMBIN TIME: CPT | Performed by: EMERGENCY MEDICINE

## 2023-10-01 PROCEDURE — 96366 THER/PROPH/DIAG IV INF ADDON: CPT

## 2023-10-01 PROCEDURE — 85025 COMPLETE CBC W/AUTO DIFF WBC: CPT | Performed by: EMERGENCY MEDICINE

## 2023-10-01 PROCEDURE — 99285 EMERGENCY DEPT VISIT HI MDM: CPT

## 2023-10-01 PROCEDURE — 83690 ASSAY OF LIPASE: CPT | Performed by: EMERGENCY MEDICINE

## 2023-10-01 RX ORDER — LORAZEPAM 2 MG/ML
1 INJECTION INTRAMUSCULAR ONCE
Status: COMPLETED | OUTPATIENT
Start: 2023-10-01 | End: 2023-10-01

## 2023-10-01 RX ORDER — MAGNESIUM SULFATE HEPTAHYDRATE 40 MG/ML
2 INJECTION, SOLUTION INTRAVENOUS ONCE
Status: COMPLETED | OUTPATIENT
Start: 2023-10-01 | End: 2023-10-01

## 2023-10-01 RX ADMIN — LORAZEPAM 1 MG: 2 INJECTION INTRAMUSCULAR; INTRAVENOUS at 19:09

## 2023-10-01 RX ADMIN — MAGNESIUM SULFATE HEPTAHYDRATE 2 G: 40 INJECTION, SOLUTION INTRAVENOUS at 19:49

## 2023-10-01 NOTE — ED PROVIDER NOTES
History  Chief Complaint   Patient presents with   • Chest Pain     Via EMS from home, Chest pain, SOB started approx 1 hr PTA during a family disagreement. Under a lot of stress lately. Vomited x1. ASA 324mg and Nitro x2 by EMS with relief. Patient under a lot of stress. States he just lost his family camper today. Was in a verbal argument when he developed chest pain going up into his neck. Started approximately 1 hour ago. Had nausea and vomiting vomiting. No diaphoresis. No shortness of breath. Was given aspirin and nitroglycerin with some relief of his symptoms. Patient does not smoke. History of diabetes. Has history of hypertension. Twin brother has 6 cardiac stents. History provided by:  Patient   used: No    Chest Pain  Pain location:  Substernal area  Pain quality: aching    Pain radiates to:  Neck  Pain radiates to the back: no    Pain severity:  Mild  Onset quality:  Sudden  Duration:  1 hour  Timing:  Constant  Progression:  Improving  Chronicity:  New  Context: stress    Context: not breathing, no intercourse and not raising an arm    Relieved by:  Aspirin and nitroglycerin  Worsened by:  Nothing tried  Ineffective treatments:  None tried  Associated symptoms: nausea and vomiting    Associated symptoms: no abdominal pain, no altered mental status, no anxiety, no claudication, no cough, no dizziness, no dysphagia, no fever, no headache, no palpitations and no shortness of breath        Prior to Admission Medications   Prescriptions Last Dose Informant Patient Reported? Taking?    ALPRAZolam (XANAX) 1 mg tablet   Yes No   Sig: Take by mouth   ASPIRIN 81 PO   Yes No   Sig: Take by mouth   Dulaglutide (Trulicity) 0.08 AI/8.1GR SOPN   Yes No   Sig: Inject under the skin   Magnesium 400 MG TABS   Yes No   Sig: Take by mouth   atorvastatin (LIPITOR) 20 mg tablet   Yes No   Sig: Take 20 mg by mouth daily   cholecalciferol (VITAMIN D3) 1,000 units tablet   Yes No   Sig: Take 1,000 Units by mouth daily   co-enzyme Q-10 30 MG capsule   Yes No   Sig: Take 30 mg by mouth daily   fexofenadine (ALLEGRA) 180 MG tablet   Yes No   Sig: Take by mouth   fluvoxaMINE (LUVOX) 100 mg tablet   Yes No   Sig: Take 100 mg by mouth   ibuprofen (MOTRIN) 600 mg tablet   No No   Sig: Take 1 tablet (600 mg total) by mouth every 6 (six) hours as needed for moderate pain   losartan (COZAAR) 25 mg tablet   Yes No   Sig: Take 25 mg by mouth daily   metFORMIN (GLUCOPHAGE) 1000 MG tablet   Yes No   Sig: Take 1,000 mg by mouth   pantoprazole (PROTONIX) 40 mg tablet   Yes No   Sig: Take by mouth      Facility-Administered Medications: None       Past Medical History:   Diagnosis Date   • Diabetes mellitus ([address])    • Hypertension    • Kidney stone        Past Surgical History:   Procedure Laterality Date   • CHOLECYSTECTOMY LAPAROSCOPIC N/A 5/30/2021    Procedure: CHOLECYSTECTOMY LAPAROSCOPIC;  Surgeon: Haydee Rhoades DO;  Location:  MAIN OR;  Service: General       History reviewed. No pertinent family history. I have reviewed and agree with the history as documented. E-Cigarette/Vaping   • E-Cigarette Use Never User      E-Cigarette/Vaping Substances   • Nicotine No    • THC No    • CBD No    • Flavoring No    • Other No    • Unknown No      Social History     Tobacco Use   • Smoking status: Former     Types: Cigarettes   • Smokeless tobacco: Never   Vaping Use   • Vaping Use: Never used   Substance Use Topics   • Alcohol use: Yes   • Drug use: Not Currently       Review of Systems   Constitutional: Negative for chills and fever. HENT: Negative for ear pain, hearing loss, sore throat, trouble swallowing and voice change. Eyes: Negative for pain and discharge. Respiratory: Negative for cough, shortness of breath and wheezing. Cardiovascular: Positive for chest pain. Negative for palpitations and claudication. Gastrointestinal: Positive for nausea and vomiting.  Negative for abdominal pain, blood in stool, constipation and diarrhea. Genitourinary: Negative for dysuria, flank pain, frequency and hematuria. Musculoskeletal: Negative for joint swelling, neck pain and neck stiffness. Skin: Negative for rash and wound. Neurological: Negative for dizziness, seizures, syncope, facial asymmetry and headaches. Psychiatric/Behavioral: Negative for hallucinations, self-injury and suicidal ideas. All other systems reviewed and are negative. Physical Exam  Physical Exam  Vitals and nursing note reviewed. Constitutional:       General: He is not in acute distress. Appearance: He is well-developed. HENT:      Head: Normocephalic and atraumatic. Right Ear: External ear normal.      Left Ear: External ear normal.   Eyes:      General: No scleral icterus. Right eye: No discharge. Left eye: No discharge. Extraocular Movements: Extraocular movements intact. Conjunctiva/sclera: Conjunctivae normal.   Cardiovascular:      Rate and Rhythm: Normal rate and regular rhythm. Heart sounds: Normal heart sounds. No murmur heard. Pulmonary:      Effort: Pulmonary effort is normal.      Breath sounds: Normal breath sounds. No wheezing or rales. Abdominal:      General: Bowel sounds are normal. There is no distension. Palpations: Abdomen is soft. Tenderness: There is no abdominal tenderness. There is no guarding or rebound. Musculoskeletal:         General: No deformity. Normal range of motion. Cervical back: Normal range of motion and neck supple. Skin:     General: Skin is warm and dry. Findings: No rash. Neurological:      General: No focal deficit present. Mental Status: He is alert and oriented to person, place, and time. Cranial Nerves: No cranial nerve deficit. Psychiatric:         Mood and Affect: Mood normal.         Behavior: Behavior normal.         Thought Content:  Thought content normal.         Judgment: Judgment normal. Vital Signs  ED Triage Vitals [10/01/23 1855]   Temperature Pulse Respirations Blood Pressure SpO2   98 °F (36.7 °C) 79 19 117/74 95 %      Temp Source Heart Rate Source Patient Position - Orthostatic VS BP Location FiO2 (%)   Oral Monitor Lying Left arm --      Pain Score       5           Vitals:    10/01/23 1855 10/01/23 1945 10/01/23 2015 10/01/23 2115   BP: 117/74 118/78 125/78 124/83   Pulse: 79 72 69 70   Patient Position - Orthostatic VS: Lying Lying Lying Lying         Visual Acuity      ED Medications  Medications   magnesium sulfate 2 g/50 mL IVPB (premix) 2 g (2 g Intravenous New Bag 10/1/23 1949)   LORazepam (ATIVAN) injection 1 mg (1 mg Intravenous Given 10/1/23 1909)       Diagnostic Studies  Results Reviewed     Procedure Component Value Units Date/Time    HS Troponin I 2hr [[id number]] Collected: 10/01/23 2034    Lab Status: Final result Specimen: Blood from Line, Venous Updated: 10/01/23 2105     hs TnI 2hr <2 ng/L      Delta 2hr hsTnI --    HS Troponin I 4hr [[id number]]     Lab Status: No result Specimen: Blood     HS Troponin 0hr (reflex protocol) [[id number]]  (Normal) Collected: 10/01/23 1905    Lab Status: Final result Specimen: Blood from Arm, Left Updated: 10/01/23 1932     hs TnI 0hr <2 ng/L     Lactic acid, plasma (w/reflex if result > 2.0) [[id number]]  (Normal) Collected: 10/01/23 1905    Lab Status: Final result Specimen: Blood from Arm, Left Updated: 10/01/23 1929     LACTIC ACID 1.4 mmol/L     Narrative:      Result may be elevated if tourniquet was used during collection.     Comprehensive metabolic panel [[id number]] Collected: 10/01/23 1905    Lab Status: Final result Specimen: Blood from Arm, Left Updated: 10/01/23 1929     Sodium 141 mmol/L      Potassium 3.6 mmol/L      Chloride 108 mmol/L      CO2 23 mmol/L      ANION GAP 10 mmol/L      BUN 18 mg/dL      Creatinine 1.07 mg/dL      Glucose 90 mg/dL      Calcium 9.4 mg/dL      AST 24 U/L      ALT 31 U/L      Alkaline Phosphatase 58 U/L      Total Protein 7.0 g/dL      Albumin 4.4 g/dL      Total Bilirubin 0.70 mg/dL      eGFR 74 ml/min/1.73sq m     Narrative:      [facility] guidelines for Chronic Kidney Disease (CKD):   •  Stage 1 with normal or high GFR (GFR > 90 mL/min/1.73 square meters)  •  Stage 2 Mild CKD (GFR = 60-89 mL/min/1.73 square meters)  •  Stage 3A Moderate CKD (GFR = 45-59 mL/min/1.73 square meters)  •  Stage 3B Moderate CKD (GFR = 30-44 mL/min/1.73 square meters)  •  Stage 4 Severe CKD (GFR = 15-29 mL/min/1.73 square meters)  •  Stage 5 End Stage CKD (GFR <15 mL/min/1.73 square meters)  Note: GFR calculation is accurate only with a steady state creatinine    Magnesium [[id number]]  (Abnormal) Collected: 10/01/23 1905    Lab Status: Final result Specimen: Blood from Arm, Left Updated: 10/01/23 1929     Magnesium 1.4 mg/dL     Lipase [[id number]]  (Normal) Collected: 10/01/23 1905    Lab Status: Final result Specimen: Blood from Arm, Left Updated: 10/01/23 1929     Lipase 49 u/L     Protime-INR [[id number]]  (Normal) Collected: 10/01/23 1905    Lab Status: Final result Specimen: Blood from Arm, Left Updated: 10/01/23 1924     Protime 13.0 seconds      INR 0.96    APTT [[id number]]  (Abnormal) Collected: 10/01/23 1905    Lab Status: Final result Specimen: Blood from Arm, Left Updated: 10/01/23 1924     PTT 22 seconds     CBC and differential [[id number]]  (Abnormal) Collected: 10/01/23 1905    Lab Status: Final result Specimen: Blood from Arm, Left Updated: 10/01/23 1911     WBC 12.27 Thousand/uL      RBC 4.36 Million/uL      Hemoglobin 14.3 g/dL      Hematocrit 40.9 %      MCV 94 fL      MCH 32.8 pg      MCHC 35.0 g/dL      RDW 12.2 %      MPV 8.9 fL      Platelets 532 Thousands/uL      nRBC 0 /100 WBCs      Neutrophils Relative 69 %      Immat GRANS % 0 %      Lymphocytes Relative 21 %      Monocytes Relative 7 %      Eosinophils Relative 2 %      Basophils Relative 1 %      Neutrophils Absolute 8.54 Thousands/µL      Immature Grans Absolute 0.04 Thousand/uL      Lymphocytes Absolute 2.54 Thousands/µL      Monocytes Absolute 0.80 Thousand/µL      Eosinophils Absolute 0.28 Thousand/µL      Basophils Absolute 0.07 Thousands/µL                  XR chest 1 view portable   ED Interpretation by Sol Starks MD (10/01 1919)   NAD                 Procedures  ECG 12 Lead Documentation Only    Date/Time: 10/1/2023 7:02 PM    Performed by: Sol Starks MD  Authorized by: Sol Starks MD    ECG reviewed by me, the ED Provider: yes    Patient location:  ED  Rate:     ECG rate:  70  Rhythm:     Rhythm: sinus rhythm    Ectopy:     Ectopy: none    QRS:     QRS axis:  Normal             ED Course  ED Course as of 10/01/23 2138   [facility] Oct 01, 2023   1933 Patient seen. Feels better after IV Ativan. 2037 EKG #2 shows a normal sinus rhythm with a rate of 70. No acute ST changes noted. No change from previous EKG. 2138 Patient with a heart score 4. EKG and troponins are unremarkable x2. However, given the patient's risk factors I have put in a referral for cardiology. Discussed with patient. Agrees with plan of care. HEART Risk Score    Flowsheet Row Most Recent Value   Heart Score Risk Calculator    History 1 Filed at: 10/01/2023 1904   ECG 0 Filed at: 10/01/2023 1904   Age 1 Filed at: 10/01/2023 1904   Risk Factors 2 Filed at: 10/01/2023 1904   Troponin 0 Filed at: 10/01/2023 1904   HEART Score 4 Filed at: 10/01/2023 1904                        SBIRT 20yo+    Flowsheet Row Most Recent Value   Initial Alcohol Screen: US AUDIT-C     1. How often do you have a drink containing alcohol? 1 Filed at: 10/01/2023 1917   2. How many drinks containing alcohol do you have on a typical day you are drinking? 0 Filed at: 10/01/2023 1917   3a. Male UNDER 65: How often do you have five or more drinks on one occasion?  0 Filed at: 10/01/2023 1917   Audit-C Score 1 Filed at: 10/01/2023 0097   KRAIG: How many times in the past year have you. .. Used an illegal drug or used a prescription medication for non-medical reasons? Never Filed at: 10/01/2023 2606                    Medical Decision Making  Amount and/or Complexity of Data Reviewed  Labs: ordered. Decision-making details documented in ED Course. Radiology: ordered and independent interpretation performed. Decision-making details documented in ED Course. ECG/medicine tests: ordered and independent interpretation performed. Decision-making details documented in ED Course. Details: Differential diagnosis includes but not limited to STEMI, NSTEMI, PE, pneumonia, pneumothorax, musculoskeletal chest pain, costochondritis, gastritis, cholelithiasis, contusion, strain      Risk  Prescription drug management. Disposition  Final diagnoses:   Atypical chest pain   Hypomagnesemia     Time reflects when diagnosis was documented in both MDM as applicable and the Disposition within this note     Time User Action Codes Description Comment    10/1/2023  8:38 PM Navya Walsh Add [R07.89] Atypical chest pain     10/1/2023  9:38 PM Navya Walsh Add [F44.41] Hypomagnesemia       ED Disposition     ED Disposition   Discharge    Condition   Stable    Date/Time   Sun Oct 1, 2023  8:38 PM    Comment   Josephus Crimes discharge to home/self care.                Follow-up Information     Follow up With Specialties Details Why [address] [facility] [address], [address] [facility], Nurse Practitioner Call in 1 day  [address] [place] [id number] [phone]            Patient's Medications   Discharge Prescriptions    No medications on file           PDMP Review       Value Time User    PDMP Reviewed  Yes 5/31/2021  8:34 AM Magdalena Alfaro PA-C          ED Provider  Electronically Signed by           Navya Walsh MD  10/01/23 2126       Navya Walsh MD  10/01/23 2139

## 2023-10-04 LAB
ATRIAL RATE: 65 BPM
ATRIAL RATE: 69 BPM
P AXIS: 21 DEGREES
P AXIS: 6 DEGREES
PR INTERVAL: 152 MS
PR INTERVAL: 152 MS
QRS AXIS: 51 DEGREES
QRS AXIS: 69 DEGREES
QRSD INTERVAL: 82 MS
QRSD INTERVAL: 92 MS
QT INTERVAL: 402 MS
QT INTERVAL: 416 MS
QTC INTERVAL: 430 MS
QTC INTERVAL: 432 MS
T WAVE AXIS: 31 DEGREES
T WAVE AXIS: 45 DEGREES
VENTRICULAR RATE: 65 BPM
VENTRICULAR RATE: 69 BPM

## 2023-10-13 ENCOUNTER — TELEPHONE (OUTPATIENT)
Dept: CARDIOLOGY CLINIC | Facility: CLINIC | Age: 63
End: 2023-10-13

## (undated) DEVICE — ENDOPATH PNEUMONEEDLE INSUFFLATION NEEDLES WITH LUER LOCK CONNECTORS 120MM: Brand: ENDOPATH

## (undated) DEVICE — SUT VICRYL 4-0 PS-2 27 IN J426H

## (undated) DEVICE — TROCAR: Brand: KII SLEEVE

## (undated) DEVICE — IRRIG ENDO FLO TUBING

## (undated) DEVICE — TUBING SMOKE EVAC W/FILTRATION DEVICE PLUMEPORT ACTIV

## (undated) DEVICE — TROCAR: Brand: KII FIOS FIRST ENTRY

## (undated) DEVICE — ENDOPOUCH RETRIEVER SPECIMEN RETRIEVAL BAGS: Brand: ENDOPOUCH RETRIEVER

## (undated) DEVICE — GLOVE SRG BIOGEL 6

## (undated) DEVICE — PENCIL ELECTROSURG E-Z CLEAN -0035H

## (undated) DEVICE — SINGLE PORT MANIFOLD: Brand: NEPTUNE 2

## (undated) DEVICE — VISUALIZATION SYSTEM: Brand: CLEARIFY

## (undated) DEVICE — INTENDED FOR TISSUE SEPARATION, AND OTHER PROCEDURES THAT REQUIRE A SHARP SURGICAL BLADE TO PUNCTURE OR CUT.: Brand: BARD-PARKER SAFETY BLADES SIZE 11, STERILE

## (undated) DEVICE — PAD GROUNDING ADULT

## (undated) DEVICE — TUBING INSUFFLATION SET ISO CONNECTOR

## (undated) DEVICE — CHLORAPREP HI-LITE 26ML ORANGE

## (undated) DEVICE — GLOVE INDICATOR PI UNDERGLOVE SZ 7.5 BLUE

## (undated) DEVICE — SUT VICRYL 0 REEL 54 IN J287G

## (undated) DEVICE — DRAPE EQUIPMENT RF WAND

## (undated) DEVICE — PMI DISPOSABLE PUNCTURE CLOSURE DEVICE / SUTURE GRASPER: Brand: PMI

## (undated) DEVICE — GLOVE SRG BIOGEL ECLIPSE 8.5

## (undated) DEVICE — LIGACLIP 10-M/L, 10MM ENDOSCOPIC ROTATING MULTIPLE CLIP APPLIERS: Brand: LIGACLIP

## (undated) DEVICE — SCD SEQUENTIAL COMPRESSION COMFORT SLEEVE MEDIUM KNEE LENGTH: Brand: KENDALL SCD

## (undated) DEVICE — GLOVE INDICATOR PI UNDERGLOVE SZ 6.5 BLUE

## (undated) DEVICE — ADHESIVE SKIN HIGH VISCOSITY EXOFIN 1ML

## (undated) DEVICE — GLOVE INDICATOR PI UNDERGLOVE SZ 8.5 BLUE

## (undated) DEVICE — ALLENTOWN LAP CHOLE APP PACK: Brand: CARDINAL HEALTH

## (undated) DEVICE — GLOVE SRG BIOGEL ECLIPSE 8

## (undated) DEVICE — LAPAROSCOPIC SMOKE EVAC TUBING

## (undated) DEVICE — ELECTRODE LAP J HOOK E-Z CLEAN 33CM-0021